# Patient Record
Sex: FEMALE | Race: WHITE | NOT HISPANIC OR LATINO
[De-identification: names, ages, dates, MRNs, and addresses within clinical notes are randomized per-mention and may not be internally consistent; named-entity substitution may affect disease eponyms.]

---

## 2017-07-26 ENCOUNTER — APPOINTMENT (OUTPATIENT)
Dept: CT IMAGING | Facility: IMAGING CENTER | Age: 82
End: 2017-07-26

## 2017-07-26 ENCOUNTER — OUTPATIENT (OUTPATIENT)
Dept: OUTPATIENT SERVICES | Facility: HOSPITAL | Age: 82
LOS: 1 days | End: 2017-07-26
Payer: MEDICARE

## 2017-07-26 DIAGNOSIS — Z00.8 ENCOUNTER FOR OTHER GENERAL EXAMINATION: ICD-10-CM

## 2017-07-26 PROCEDURE — 71250 CT THORAX DX C-: CPT

## 2018-07-11 ENCOUNTER — APPOINTMENT (OUTPATIENT)
Dept: CT IMAGING | Facility: IMAGING CENTER | Age: 83
End: 2018-07-11
Payer: MEDICARE

## 2018-07-11 ENCOUNTER — OUTPATIENT (OUTPATIENT)
Dept: OUTPATIENT SERVICES | Facility: HOSPITAL | Age: 83
LOS: 1 days | End: 2018-07-11
Payer: MEDICARE

## 2018-07-11 DIAGNOSIS — J47.9 BRONCHIECTASIS, UNCOMPLICATED: ICD-10-CM

## 2018-07-11 DIAGNOSIS — R05 COUGH: ICD-10-CM

## 2018-07-11 DIAGNOSIS — A31.1 CUTANEOUS MYCOBACTERIAL INFECTION: ICD-10-CM

## 2018-07-11 PROCEDURE — 71250 CT THORAX DX C-: CPT

## 2018-07-11 PROCEDURE — 71250 CT THORAX DX C-: CPT | Mod: 26

## 2019-06-07 ENCOUNTER — APPOINTMENT (OUTPATIENT)
Dept: CT IMAGING | Facility: CLINIC | Age: 84
End: 2019-06-07
Payer: MEDICARE

## 2019-06-07 ENCOUNTER — OUTPATIENT (OUTPATIENT)
Dept: OUTPATIENT SERVICES | Facility: HOSPITAL | Age: 84
LOS: 1 days | End: 2019-06-07
Payer: MEDICARE

## 2019-06-07 DIAGNOSIS — R05 COUGH: ICD-10-CM

## 2019-06-07 DIAGNOSIS — J47.9 BRONCHIECTASIS, UNCOMPLICATED: ICD-10-CM

## 2019-06-07 DIAGNOSIS — A31.8 OTHER MYCOBACTERIAL INFECTIONS: ICD-10-CM

## 2019-06-07 PROCEDURE — 71250 CT THORAX DX C-: CPT

## 2019-06-07 PROCEDURE — 71250 CT THORAX DX C-: CPT | Mod: 26

## 2019-06-25 ENCOUNTER — OUTPATIENT (OUTPATIENT)
Dept: OUTPATIENT SERVICES | Facility: HOSPITAL | Age: 84
LOS: 1 days | End: 2019-06-25
Payer: MEDICARE

## 2019-06-25 ENCOUNTER — APPOINTMENT (OUTPATIENT)
Dept: RADIOLOGY | Facility: CLINIC | Age: 84
End: 2019-06-25
Payer: MEDICARE

## 2019-06-25 DIAGNOSIS — Z00.8 ENCOUNTER FOR OTHER GENERAL EXAMINATION: ICD-10-CM

## 2019-06-25 PROCEDURE — 71046 X-RAY EXAM CHEST 2 VIEWS: CPT

## 2019-06-25 PROCEDURE — 71046 X-RAY EXAM CHEST 2 VIEWS: CPT | Mod: 26

## 2019-08-26 ENCOUNTER — INPATIENT (INPATIENT)
Facility: HOSPITAL | Age: 84
LOS: 2 days | Discharge: HOME | DRG: 643 | End: 2019-08-29
Attending: INTERNAL MEDICINE | Admitting: INTERNAL MEDICINE
Payer: MEDICARE

## 2019-08-26 VITALS
HEART RATE: 82 BPM | SYSTOLIC BLOOD PRESSURE: 180 MMHG | TEMPERATURE: 98 F | RESPIRATION RATE: 16 BRPM | HEIGHT: 65 IN | WEIGHT: 97 LBS | DIASTOLIC BLOOD PRESSURE: 82 MMHG | OXYGEN SATURATION: 96 %

## 2019-08-26 DIAGNOSIS — R47.81 SLURRED SPEECH: ICD-10-CM

## 2019-08-26 LAB
ALBUMIN SERPL ELPH-MCNC: 4.4 G/DL — SIGNIFICANT CHANGE UP (ref 3.3–5)
ALP SERPL-CCNC: 57 U/L — SIGNIFICANT CHANGE UP (ref 40–120)
ALT FLD-CCNC: 12 U/L — SIGNIFICANT CHANGE UP (ref 10–45)
ANION GAP SERPL CALC-SCNC: 13 MMOL/L — SIGNIFICANT CHANGE UP (ref 5–17)
APPEARANCE UR: CLEAR — SIGNIFICANT CHANGE UP
APTT BLD: 28.4 SEC — SIGNIFICANT CHANGE UP (ref 27.5–36.3)
AST SERPL-CCNC: 15 U/L — SIGNIFICANT CHANGE UP (ref 10–40)
BACTERIA # UR AUTO: NEGATIVE — SIGNIFICANT CHANGE UP
BASOPHILS # BLD AUTO: 0 K/UL — SIGNIFICANT CHANGE UP (ref 0–0.2)
BASOPHILS NFR BLD AUTO: 0.3 % — SIGNIFICANT CHANGE UP (ref 0–2)
BILIRUB SERPL-MCNC: 0.8 MG/DL — SIGNIFICANT CHANGE UP (ref 0.2–1.2)
BILIRUB UR-MCNC: NEGATIVE — SIGNIFICANT CHANGE UP
BUN SERPL-MCNC: 20 MG/DL — SIGNIFICANT CHANGE UP (ref 7–23)
CALCIUM SERPL-MCNC: 10.2 MG/DL — SIGNIFICANT CHANGE UP (ref 8.4–10.5)
CHLORIDE SERPL-SCNC: 87 MMOL/L — LOW (ref 96–108)
CO2 SERPL-SCNC: 25 MMOL/L — SIGNIFICANT CHANGE UP (ref 22–31)
COLOR SPEC: SIGNIFICANT CHANGE UP
CREAT ?TM UR-MCNC: 30 MG/DL — SIGNIFICANT CHANGE UP
CREAT SERPL-MCNC: 0.99 MG/DL — SIGNIFICANT CHANGE UP (ref 0.5–1.3)
DIFF PNL FLD: NEGATIVE — SIGNIFICANT CHANGE UP
EOSINOPHIL # BLD AUTO: 0.1 K/UL — SIGNIFICANT CHANGE UP (ref 0–0.5)
EOSINOPHIL NFR BLD AUTO: 0.4 % — SIGNIFICANT CHANGE UP (ref 0–6)
EPI CELLS # UR: 1 /HPF — SIGNIFICANT CHANGE UP
GLUCOSE SERPL-MCNC: 134 MG/DL — HIGH (ref 70–99)
GLUCOSE UR QL: NEGATIVE — SIGNIFICANT CHANGE UP
HCT VFR BLD CALC: 42 % — SIGNIFICANT CHANGE UP (ref 34.5–45)
HGB BLD-MCNC: 13.7 G/DL — SIGNIFICANT CHANGE UP (ref 11.5–15.5)
HYALINE CASTS # UR AUTO: 0 /LPF — SIGNIFICANT CHANGE UP (ref 0–2)
INR BLD: 1.17 RATIO — HIGH (ref 0.88–1.16)
KETONES UR-MCNC: ABNORMAL
LEUKOCYTE ESTERASE UR-ACNC: NEGATIVE — SIGNIFICANT CHANGE UP
LYMPHOCYTES # BLD AUTO: 1.3 K/UL — SIGNIFICANT CHANGE UP (ref 1–3.3)
LYMPHOCYTES # BLD AUTO: 10.7 % — LOW (ref 13–44)
MCHC RBC-ENTMCNC: 30.2 PG — SIGNIFICANT CHANGE UP (ref 27–34)
MCHC RBC-ENTMCNC: 32.7 GM/DL — SIGNIFICANT CHANGE UP (ref 32–36)
MCV RBC AUTO: 92.5 FL — SIGNIFICANT CHANGE UP (ref 80–100)
MONOCYTES # BLD AUTO: 1 K/UL — HIGH (ref 0–0.9)
MONOCYTES NFR BLD AUTO: 8.3 % — SIGNIFICANT CHANGE UP (ref 2–14)
NEUTROPHILS # BLD AUTO: 10.1 K/UL — HIGH (ref 1.8–7.4)
NEUTROPHILS NFR BLD AUTO: 80.3 % — HIGH (ref 43–77)
NITRITE UR-MCNC: NEGATIVE — SIGNIFICANT CHANGE UP
OSMOLALITY UR: 322 MOS/KG — SIGNIFICANT CHANGE UP (ref 300–900)
PH UR: 6.5 — SIGNIFICANT CHANGE UP (ref 5–8)
PLATELET # BLD AUTO: 347 K/UL — SIGNIFICANT CHANGE UP (ref 150–400)
POTASSIUM SERPL-MCNC: 4.3 MMOL/L — SIGNIFICANT CHANGE UP (ref 3.5–5.3)
POTASSIUM SERPL-SCNC: 4.3 MMOL/L — SIGNIFICANT CHANGE UP (ref 3.5–5.3)
PROT SERPL-MCNC: 8.1 G/DL — SIGNIFICANT CHANGE UP (ref 6–8.3)
PROT UR-MCNC: SIGNIFICANT CHANGE UP
PROTHROM AB SERPL-ACNC: 13.4 SEC — HIGH (ref 10–12.9)
RBC # BLD: 4.54 M/UL — SIGNIFICANT CHANGE UP (ref 3.8–5.2)
RBC # FLD: 12.3 % — SIGNIFICANT CHANGE UP (ref 10.3–14.5)
RBC CASTS # UR COMP ASSIST: 1 /HPF — SIGNIFICANT CHANGE UP (ref 0–4)
SODIUM SERPL-SCNC: 125 MMOL/L — LOW (ref 135–145)
SODIUM UR-SCNC: 55 MMOL/L — SIGNIFICANT CHANGE UP
SP GR SPEC: 1.03 — HIGH (ref 1.01–1.02)
UROBILINOGEN FLD QL: NEGATIVE — SIGNIFICANT CHANGE UP
WBC # BLD: 12.5 K/UL — HIGH (ref 3.8–10.5)
WBC # FLD AUTO: 12.5 K/UL — HIGH (ref 3.8–10.5)
WBC UR QL: 0 /HPF — SIGNIFICANT CHANGE UP (ref 0–5)

## 2019-08-26 PROCEDURE — 70498 CT ANGIOGRAPHY NECK: CPT | Mod: 26

## 2019-08-26 PROCEDURE — 70496 CT ANGIOGRAPHY HEAD: CPT | Mod: 26

## 2019-08-26 PROCEDURE — 93010 ELECTROCARDIOGRAM REPORT: CPT

## 2019-08-26 PROCEDURE — 99284 EMERGENCY DEPT VISIT MOD MDM: CPT | Mod: GC

## 2019-08-26 RX ORDER — LISINOPRIL 2.5 MG/1
20 TABLET ORAL DAILY
Refills: 0 | Status: DISCONTINUED | OUTPATIENT
Start: 2019-08-26 | End: 2019-08-27

## 2019-08-26 RX ORDER — AMLODIPINE BESYLATE 2.5 MG/1
5 TABLET ORAL DAILY
Refills: 0 | Status: DISCONTINUED | OUTPATIENT
Start: 2019-08-26 | End: 2019-08-29

## 2019-08-26 RX ORDER — HEPARIN SODIUM 5000 [USP'U]/ML
5000 INJECTION INTRAVENOUS; SUBCUTANEOUS EVERY 12 HOURS
Refills: 0 | Status: DISCONTINUED | OUTPATIENT
Start: 2019-08-26 | End: 2019-08-29

## 2019-08-26 RX ORDER — ASPIRIN/CALCIUM CARB/MAGNESIUM 324 MG
81 TABLET ORAL DAILY
Refills: 0 | Status: DISCONTINUED | OUTPATIENT
Start: 2019-08-26 | End: 2019-08-29

## 2019-08-26 RX ORDER — SODIUM CHLORIDE 9 MG/ML
1000 INJECTION INTRAMUSCULAR; INTRAVENOUS; SUBCUTANEOUS ONCE
Refills: 0 | Status: COMPLETED | OUTPATIENT
Start: 2019-08-26 | End: 2019-08-26

## 2019-08-26 RX ORDER — CARVEDILOL PHOSPHATE 80 MG/1
12.5 CAPSULE, EXTENDED RELEASE ORAL EVERY 12 HOURS
Refills: 0 | Status: DISCONTINUED | OUTPATIENT
Start: 2019-08-26 | End: 2019-08-28

## 2019-08-26 RX ORDER — ACETAMINOPHEN 500 MG
650 TABLET ORAL EVERY 6 HOURS
Refills: 0 | Status: DISCONTINUED | OUTPATIENT
Start: 2019-08-26 | End: 2019-08-29

## 2019-08-26 RX ADMIN — SODIUM CHLORIDE 1000 MILLILITER(S): 9 INJECTION INTRAMUSCULAR; INTRAVENOUS; SUBCUTANEOUS at 16:09

## 2019-08-26 NOTE — CONSULT NOTE ADULT - ATTENDING COMMENTS
VASCULAR NEUROLOGY ATTENDING  The patient is seen and examined the history and imaging are reviewed. I agree with the resident note unless otherwise noted. Patient at her neurologic baseline. Suspect hypertensive encephalopathy. Outpatient neurology follow-up.

## 2019-08-26 NOTE — ED ADULT NURSE NOTE - OBJECTIVE STATEMENT
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 Code stroke was called in triage at 1431. Came in to main ER at 1438  straight to Ct scan. Stroke protocol followed. Pt is fully awake, alert and orientedx3. Speech is clear and all extremities mobile. With strong hand grasp.  reports pt was last seen normal at 12 midnight. This morning at around 0900, pt speech was slurred, finding difficulty in speaking, dizzy, off balance and had difficulty on putting on her clothes.  states they went to PMD's office and was sent here for further evaluation. Denies chest pain, sob, N/V, fever or chills. With occasional productive cough of whitish secretions.  at bedside. Comfort/safety maintained. Will closely monitor.

## 2019-08-26 NOTE — H&P ADULT - NSHPLABSRESULTS_GEN_ALL_CORE
13.7   12.5  )-----------( 347      ( 26 Aug 2019 14:42 )             42.0           125<L>  |  87<L>  |  20  ----------------------------<  134<H>  4.3   |  25  |  0.99    Ca    10.2      26 Aug 2019 14:42    TPro  8.1  /  Alb  4.4  /  TBili  0.8  /  DBili  x   /  AST  15  /  ALT  12  /  AlkPhos  57                Urinalysis Basic - ( 26 Aug 2019 18:53 )    Color: Light Yellow / Appearance: Clear / S.027 / pH: x  Gluc: x / Ketone: Small  / Bili: Negative / Urobili: Negative   Blood: x / Protein: Trace / Nitrite: Negative   Leuk Esterase: Negative / RBC: 1 /hpf / WBC 0 /HPF   Sq Epi: x / Non Sq Epi: 1 /hpf / Bacteria: Negative        PT/INR - ( 26 Aug 2019 14:42 )   PT: 13.4 sec;   INR: 1.17 ratio         PTT - ( 26 Aug 2019 14:42 )  PTT:28.4 sec    Lactate Trend            CAPILLARY BLOOD GLUCOSE  129 (26 Aug 2019 15:01)      POCT Blood Glucose.: 129 mg/dL (26 Aug 2019 14:38)      < from: CT Angio Neck w/ IV Cont (19 @ 15:21) >    IMPRESSION: Mild atrophy appropriate for age. No hemorrhage or mass.   Normal CTA of the head and neck.    < end of copied text >    EKG SR NSST/T

## 2019-08-26 NOTE — CONSULT NOTE ADULT - ASSESSMENT
Assessment:  84y R-handed White F with PMH of HTN and PEACE on rifampin presenting as code stroke for confusion, dysarthria and word-finding difficulty.  LKW of 12:00 08/26/19.  Symptoms have resolved other than confusion (trouble putting clothes on and responding inappropriately).  Confusion could be due to active PEACE as well as hyponatremia     Not a tPA candidate as symptoms have resolved and outside window.   Not a thrombectomy candidate as NIHSS <6, symptoms resolved and no intracranial thrombus evident.     NIHSS: 0  MRS: 2    DDx include: TIA vs infectious/metabolic cause of acute altered mental status.     Plan  [] permissive HTN for 24 hours up to 220/110  [] gradual normotension after 24 hrs  [x] CT head  [x] CTA H&N  [] MRI head non contrast  [] STAT repeat CTH if change in neuro status  [x] continue ASA 81 daily  [] continue atorvastatin but can raise to 80 mg daily, titrate based on lipid profile  [] Lipid panel  [] HbA1C  [] PT/OT  [] dysphagia screen  [] From neurologic perspective, would recommend patient to have follow up outpatient with Dr. Tovar.     Assessment and plan discussed with the attending, Dr. La Wells, DO  PGY-2 Neurology Service Assessment:  84y R-handed White F with PMH of HTN and PEACE on rifampin presenting as code stroke for confusion, dysarthria and word-finding difficulty.  LKW of 12:00 08/26/19.  Symptoms have resolved other than confusion (trouble putting clothes on and responding inappropriately).  Confusion could be due to active PEACE as well as hyponatremia     Not a tPA candidate as symptoms have resolved and outside window.   Not a thrombectomy candidate as NIHSS <6, symptoms resolved and no intracranial thrombus evident.     NIHSS: 0  MRS: 2    DDx include: TIA vs infectious/metabolic cause of acute altered mental status.     Plan  [] permissive HTN for 24 hours up to 220/110  [] gradual normotension after 24 hrs  [x] CT head  [x] CTA H&N  [] MRI head non contrast in either CDU or outpatient if patient and family ok with this.   [] STAT repeat CTH if change in neuro status  [x] continue ASA 81 daily  [] continue atorvastatin but can raise to 80 mg daily, titrate based on lipid profile  [] Lipid panel  [] HbA1C  [] PT/OT  [] dysphagia screen  [] From neurologic perspective, would recommend patient to have follow up outpatient with Dr. Tovar.     Assessment and plan discussed with the attending, Dr. La Wells, DO  PGY-2 Neurology Service

## 2019-08-26 NOTE — ED PROVIDER NOTE - NS ED ROS FT
ROS: denies HA, fevers/chills, nausea/vomiting, chest pain, SOB, diaphoresis, abdominal pain, diarrhea, joint pain, dysuria/hematuria, rash    +generalized weakness, slurred speech

## 2019-08-26 NOTE — ED PROVIDER NOTE - CHIEF COMPLAINT
The patient is a 84y Female complaining of The patient is a 84y Female complaining of slurred speech

## 2019-08-26 NOTE — CONSULT NOTE ADULT - SUBJECTIVE AND OBJECTIVE BOX
NEUROLOGY CONSULT   HPI: Mrs. Amie Stovall is a 84y R-handed White woman with a PMH of HTN and PEACE on rifampin who presented on 08-26-19 as a code stroke for confusion, dysarthria and word-finding difficulties with LKW of 12:00 am 08/26/19.   reports PT woke up at 09:00 and when she started talking he noted confusion and speech difficulty manifest as slurring of speech and mixing words up.  The speech manifestations resolved prior to presentation, but the confusion was persistent.  He notes she was having difficulty putting on her clothes and was not responding appropriately to questions.        PMH:  PEACE on Rifampin  HTN    Home Medications:  Rifampin  Aspirin 81 mg  Atorvastatin 10 mg    ALLERGIES: No Known Allergies    OBJECTIVE  Vital Signs Last 24 Hrs  T(C): 37.1 (26 Aug 2019 15:08), Max: 37.1 (26 Aug 2019 15:08)  T(F): 98.8 (26 Aug 2019 15:08), Max: 98.8 (26 Aug 2019 15:08)  HR: 76 (26 Aug 2019 15:08) (76 - 82)  BP: 148/109 (26 Aug 2019 15:08) (148/109 - 180/82)  BP(mean): --  RR: 18 (26 Aug 2019 15:08) (16 - 18)  SpO2: 98% (26 Aug 2019 15:08) (96% - 98%)  I&O's Summary      PHYSICAL EXAM:  General: Older thin female appears stated age, in NAD  MSK: No erythema, tenderness or deformities.   Skin: No rashes, lesions or color changes.  Neurologic:  Mental Status: Awake, alert, oriented to person, place, situation, time. Normal affect. Follows all commands.  Slightly diminished attention span.    Language: Speech is clear, fluent with preserved naming, repetition and comprehension.    Cranial Nerves: PERRLA (R = 3mm, L = 3mm). Visual fields intact. EOMI no nystagmus. V1-3 intact to light touch.  No facial asymmetry b/l, full eye closure strength b/l. Hearing grossly normal to conversation.  Symmetric palate elevation in midline.  Head turning & shoulder shrug intact b/l. Tongue midline, normal movements, no atrophy.  Motor: Normal muscle bulk & tone. No noticeable tremor, myoclonus or pronator drift. 5/5 strength.	  Sensation: Symmetric B/L preserved sensation to light touch  Cortical: No extinction on double simultaneous touch and no signs of neglect.   Coordination: Intact rapid-alternating movements. No dysmetria on finger-to-nose or heel-to-shin.   Psychiatric: Normal mood and congruent affect.     CBC:                        13.7   12.5  )-----------( 347      ( 26 Aug 2019 14:42 )             42.0     CMP:  08-26    125<L>  |  87<L>  |  20  ----------------------------<  134<H>  4.3   |  25  |  0.99    Ca    10.2      26 Aug 2019 14:42    TPro  8.1  /  Alb  4.4  /  TBili  0.8  /  DBili  x   /  AST  15  /  ALT  12  /  AlkPhos  57  08-26    < from: CT Brain Stroke Protocol (08.26.19 @ 15:21) >   Mild atrophy appropriate for age. No hemorrhage or mass.   Normal CTA of the head and neck.    < end of copied text >

## 2019-08-26 NOTE — H&P ADULT - ASSESSMENT
84 f with    Confusion/ Mental status change  - Neurology evaluation Dr. Torres   - MRI brain  - B12, TSH, Folate  - hold Xanax    HTN control  - Nephrology evaluation  - cardiology evaluation Dr. Maradiaga    Hyponatremia  - fluid restrict  - nephrology evaluation called  - hold diuretics    PEACE  - Pulmonary evaluation Dr. Moody  - ID evaluation Dr. Casillas  - Cxr pending   - hold antibioRx     d/w patient and . Agree with plan.    Further action as per clinical course   John Rogers MD pager [phone]

## 2019-08-26 NOTE — H&P ADULT - NSHPREVIEWOFSYSTEMS_GEN_ALL_CORE
REVIEW OF SYSTEMS:    CONSTITUTIONAL: No weakness, fevers or chills  EYES/ENT: No visual changes;  No vertigo or throat pain   NECK: No pain or stiffness  RESPIRATORY: No cough, wheezing, hemoptysis; No shortness of breath  CARDIOVASCULAR: No chest pain or palpitations  GASTROINTESTINAL: No abdominal or epigastric pain. No nausea, vomiting, or hematemesis; No diarrhea or constipation. No melena or hematochezia.  GENITOURINARY: No dysuria, frequency or hematuria  NEUROLOGICAL: No numbness or weakness +confusion  SKIN: No itching, burning, rashes, or lesions   All other review of systems is negative unless indicated above.

## 2019-08-26 NOTE — H&P ADULT - HISTORY OF PRESENT ILLNESS
85yo F presents with slurred speech / weakness starting at 9am when she woke up. Last known normal was 12pm last night. Code stroke was called. Generalized weakness, patient was confused as well. Recent started Rx for PEACE with Rifampin, Azithromycin and Ethambutol .

## 2019-08-26 NOTE — ED PROVIDER NOTE - OBJECTIVE STATEMENT
85yo F presents with slurred speech / weakness starting at 9am when she woke up. Last known normal was 12pm last night. Code stroke was called. Generalized weakness, patient was confused as well

## 2019-08-26 NOTE — ED ADULT NURSE NOTE - NSIMPLEMENTINTERV_GEN_ALL_ED
Implemented All Fall with Harm Risk Interventions:  [place] to call system. Call bell, personal items and telephone within reach. Instruct patient to call for assistance. Room bathroom lighting operational. Non-slip footwear when patient is off stretcher. Physically safe environment: no spills, clutter or unnecessary equipment. Stretcher in lowest position, wheels locked, appropriate side rails in place. Provide visual cue, wrist band, yellow gown, etc. Monitor gait and stability. Monitor for mental status changes and reorient to person, place, and time. Review medications for side effects contributing to fall risk. Reinforce activity limits and safety measures with patient and family. Provide visual clues: red socks.

## 2019-08-26 NOTE — ED PROVIDER NOTE - PHYSICAL EXAMINATION
Gen: NAD  Head: NCAT  HEENT: PERRL, MMM, normal conjunctiva, anicteric, neck supple  Lung: CTAB, no adventitious sounds  CV: RRR, no murmurs, rubs or gallops  Abd: soft, NTND, no rebound or guarding, no CVAT  MSK: No edema, no visible deformities  Neuro: mildly slurred speech on exam; CN II-XII grossly intact. 5/5 strength and normal sensation in all extremities.  Skin: Warm and dry, no evidence of rash  Psych: normal mood and affect

## 2019-08-26 NOTE — ED ADULT NURSE NOTE - CHPI ED NUR SYMPTOMS NEG
no nausea/no vomiting/no change in level of consciousness/no loss of consciousness/no blurred vision/no confusion/no fever

## 2019-08-26 NOTE — H&P ADULT - NSHPPHYSICALEXAM_GEN_ALL_CORE
PHYSICAL EXAMINATION:  Vital Signs Last 24 Hrs  T(C): 37.1 (26 Aug 2019 18:00), Max: 37.1 (26 Aug 2019 15:08)  T(F): 98.8 (26 Aug 2019 18:00), Max: 98.8 (26 Aug 2019 15:08)  HR: 74 (26 Aug 2019 19:25) (70 - 82)  BP: 148/61 (26 Aug 2019 19:25) (148/61 - 180/82)  BP(mean): --  RR: 20 (26 Aug 2019 19:25) (16 - 20)  SpO2: 98% (26 Aug 2019 19:25) (96% - 99%)  CAPILLARY BLOOD GLUCOSE  129 (26 Aug 2019 15:01)      POCT Blood Glucose.: 129 mg/dL (26 Aug 2019 14:38)      GENERAL: NAD, well-groomed, well-developed  HEAD:  atraumatic, normocephalic  EYES: sclera anicteric  ENMT: mucous membranes moist  NECK: supple, No JVD  CHEST/LUNG: clear to auscultation bilaterally; no rales, rhonchi, or wheezing b/l  HEART: normal S1, S2  ABDOMEN: BS+, soft, ND, NT   EXTREMITIES:  pulses palpable; no clubbing, cyanosis, or edema b/l LEs  NEURO: awake, alert, interactive; moves all extremities  SKIN: no rashes or lesions

## 2019-08-26 NOTE — ED PROVIDER NOTE - ATTENDING CONTRIBUTION TO CARE
Patient presenting complaining of slurred speech.  Code stroke called in triage.  Per  when patient awoke at 9AM noted to be slurring speech and having difficulty getting dressed.  Further questioning revealed last known normal of last night.  Patients symptoms improving at this time - slurred speech has resolved but continuing to have confusion.  No known head trauma, no fevers, no chills, no sick contacts.    Unable to obtain full ROS secondary to patient acuity    Exam:  General: Patient well appearing, mildly hypertensive otherwise vital signs within normal limits  HEENT: airway patent with moist mucous membranes  Cardiac: RRR S1/S2 with strong peripheral pulses  Respiratory: lungs clear without respiratory distress  GI: abdomen soft, non tender, non distended  Neuro: CN grossly intact, strength 5/5 in all extremities, no slurring of speech appreciated  Skin: warm, well perfused    Patient taken to CT stat for code stroke, however by last known normal of last night outside tPA window.  Also possible infectious or metabolic etiology for patients slurred speech and confusion as no obvious lateralizing signs appreciated on exam.  Admission to medicine versus neurology for further workup.

## 2019-08-27 ENCOUNTER — TRANSCRIPTION ENCOUNTER (OUTPATIENT)
Age: 84
End: 2019-08-27

## 2019-08-27 DIAGNOSIS — E87.1 HYPO-OSMOLALITY AND HYPONATREMIA: ICD-10-CM

## 2019-08-27 DIAGNOSIS — I10 ESSENTIAL (PRIMARY) HYPERTENSION: ICD-10-CM

## 2019-08-27 LAB
ANION GAP SERPL CALC-SCNC: 12 MMOL/L — SIGNIFICANT CHANGE UP (ref 5–17)
BUN SERPL-MCNC: 15 MG/DL — SIGNIFICANT CHANGE UP (ref 7–23)
CALCIUM SERPL-MCNC: 9.4 MG/DL — SIGNIFICANT CHANGE UP (ref 8.4–10.5)
CHLORIDE SERPL-SCNC: 94 MMOL/L — LOW (ref 96–108)
CO2 SERPL-SCNC: 22 MMOL/L — SIGNIFICANT CHANGE UP (ref 22–31)
CREAT SERPL-MCNC: 0.86 MG/DL — SIGNIFICANT CHANGE UP (ref 0.5–1.3)
FOLATE SERPL-MCNC: >20 NG/ML — SIGNIFICANT CHANGE UP
GLUCOSE SERPL-MCNC: 97 MG/DL — SIGNIFICANT CHANGE UP (ref 70–99)
GRAM STN FLD: SIGNIFICANT CHANGE UP
HCT VFR BLD CALC: 35 % — SIGNIFICANT CHANGE UP (ref 34.5–45)
HGB BLD-MCNC: 11.5 G/DL — SIGNIFICANT CHANGE UP (ref 11.5–15.5)
MCHC RBC-ENTMCNC: 30.2 PG — SIGNIFICANT CHANGE UP (ref 27–34)
MCHC RBC-ENTMCNC: 32.9 GM/DL — SIGNIFICANT CHANGE UP (ref 32–36)
MCV RBC AUTO: 91.9 FL — SIGNIFICANT CHANGE UP (ref 80–100)
PLATELET # BLD AUTO: 260 K/UL — SIGNIFICANT CHANGE UP (ref 150–400)
POTASSIUM SERPL-MCNC: 3.8 MMOL/L — SIGNIFICANT CHANGE UP (ref 3.5–5.3)
POTASSIUM SERPL-SCNC: 3.8 MMOL/L — SIGNIFICANT CHANGE UP (ref 3.5–5.3)
RBC # BLD: 3.81 M/UL — SIGNIFICANT CHANGE UP (ref 3.8–5.2)
RBC # FLD: 13.2 % — SIGNIFICANT CHANGE UP (ref 10.3–14.5)
SODIUM SERPL-SCNC: 128 MMOL/L — LOW (ref 135–145)
SODIUM UR-SCNC: 27 MMOL/L — SIGNIFICANT CHANGE UP
SPECIMEN SOURCE: SIGNIFICANT CHANGE UP
TSH SERPL-MCNC: 3.5 UIU/ML — SIGNIFICANT CHANGE UP (ref 0.27–4.2)
VIT B12 SERPL-MCNC: 538 PG/ML — SIGNIFICANT CHANGE UP (ref 232–1245)
WBC # BLD: 6 K/UL — SIGNIFICANT CHANGE UP (ref 3.8–10.5)
WBC # FLD AUTO: 6 K/UL — SIGNIFICANT CHANGE UP (ref 3.8–10.5)

## 2019-08-27 PROCEDURE — 71250 CT THORAX DX C-: CPT | Mod: 26

## 2019-08-27 PROCEDURE — 99222 1ST HOSP IP/OBS MODERATE 55: CPT | Mod: GC

## 2019-08-27 PROCEDURE — 99223 1ST HOSP IP/OBS HIGH 75: CPT

## 2019-08-27 PROCEDURE — 71045 X-RAY EXAM CHEST 1 VIEW: CPT | Mod: 26

## 2019-08-27 PROCEDURE — 70551 MRI BRAIN STEM W/O DYE: CPT | Mod: 26

## 2019-08-27 RX ORDER — AMLODIPINE BESYLATE 2.5 MG/1
1 TABLET ORAL
Qty: 0 | Refills: 0 | DISCHARGE

## 2019-08-27 RX ORDER — ALBUTEROL 90 UG/1
1 AEROSOL, METERED ORAL
Qty: 0 | Refills: 0 | DISCHARGE

## 2019-08-27 RX ORDER — ETHAMBUTOL HYDROCHLORIDE 400 MG/1
600 TABLET, FILM COATED ORAL
Qty: 0 | Refills: 0 | DISCHARGE

## 2019-08-27 RX ORDER — SODIUM CHLORIDE 9 MG/ML
250 INJECTION INTRAMUSCULAR; INTRAVENOUS; SUBCUTANEOUS ONCE
Refills: 0 | Status: COMPLETED | OUTPATIENT
Start: 2019-08-27 | End: 2019-08-27

## 2019-08-27 RX ORDER — ASPIRIN/CALCIUM CARB/MAGNESIUM 324 MG
1 TABLET ORAL
Qty: 0 | Refills: 0 | DISCHARGE

## 2019-08-27 RX ORDER — LISINOPRIL 2.5 MG/1
10 TABLET ORAL DAILY
Refills: 0 | Status: DISCONTINUED | OUTPATIENT
Start: 2019-08-27 | End: 2019-08-28

## 2019-08-27 RX ORDER — ALPRAZOLAM 0.25 MG
0.5 TABLET ORAL ONCE
Refills: 0 | Status: DISCONTINUED | OUTPATIENT
Start: 2019-08-27 | End: 2019-08-27

## 2019-08-27 RX ORDER — PANTOPRAZOLE SODIUM 20 MG/1
40 TABLET, DELAYED RELEASE ORAL
Refills: 0 | Status: DISCONTINUED | OUTPATIENT
Start: 2019-08-27 | End: 2019-08-29

## 2019-08-27 RX ORDER — ATORVASTATIN CALCIUM 80 MG/1
1 TABLET, FILM COATED ORAL
Qty: 0 | Refills: 0 | DISCHARGE

## 2019-08-27 RX ORDER — POLYETHYLENE GLYCOL 3350 17 G/17G
17 POWDER, FOR SOLUTION ORAL DAILY
Refills: 0 | Status: DISCONTINUED | OUTPATIENT
Start: 2019-08-27 | End: 2019-08-29

## 2019-08-27 RX ORDER — SODIUM CHLORIDE 9 MG/ML
1 INJECTION INTRAMUSCULAR; INTRAVENOUS; SUBCUTANEOUS
Refills: 0 | Status: DISCONTINUED | OUTPATIENT
Start: 2019-08-27 | End: 2019-08-29

## 2019-08-27 RX ORDER — LISINOPRIL 2.5 MG/1
1 TABLET ORAL
Qty: 0 | Refills: 0 | DISCHARGE

## 2019-08-27 RX ADMIN — HEPARIN SODIUM 5000 UNIT(S): 5000 INJECTION INTRAVENOUS; SUBCUTANEOUS at 17:26

## 2019-08-27 RX ADMIN — HEPARIN SODIUM 5000 UNIT(S): 5000 INJECTION INTRAVENOUS; SUBCUTANEOUS at 05:46

## 2019-08-27 RX ADMIN — SODIUM CHLORIDE 1 GRAM(S): 9 INJECTION INTRAMUSCULAR; INTRAVENOUS; SUBCUTANEOUS at 17:26

## 2019-08-27 RX ADMIN — POLYETHYLENE GLYCOL 3350 17 GRAM(S): 17 POWDER, FOR SOLUTION ORAL at 12:45

## 2019-08-27 RX ADMIN — AMLODIPINE BESYLATE 5 MILLIGRAM(S): 2.5 TABLET ORAL at 05:45

## 2019-08-27 RX ADMIN — Medication 0.5 MILLIGRAM(S): at 15:07

## 2019-08-27 RX ADMIN — LISINOPRIL 20 MILLIGRAM(S): 2.5 TABLET ORAL at 05:45

## 2019-08-27 RX ADMIN — SODIUM CHLORIDE 500 MILLILITER(S): 9 INJECTION INTRAMUSCULAR; INTRAVENOUS; SUBCUTANEOUS at 18:41

## 2019-08-27 RX ADMIN — Medication 81 MILLIGRAM(S): at 12:46

## 2019-08-27 RX ADMIN — CARVEDILOL PHOSPHATE 12.5 MILLIGRAM(S): 80 CAPSULE, EXTENDED RELEASE ORAL at 05:46

## 2019-08-27 NOTE — CONSULT NOTE ADULT - ASSESSMENT
83yo F hx of PEACE, HTN presents with slurred speech / weakness starting at 9am when she woke up. Last known normal was 12pm last night. Code stroke was called. Generalized weakness, patient was confused as well. Recent started Rx for PEACE with Rifampin, Azithromycin and Ethambutol .  found to have elevated BP and hyponatremia. neuro consulted and ruled out for acute CVA.     I reviewed the CT with radiology. I do not believe the left lower lobe process represents PEACE , although I agree that the bronchiectasis and the tree in bud and GGO seen elsewhere are suggestive of PEACE but I am worried that the LLL process is different. Perhaps it represents aspiration pneumonia,. The dilated esophagus is noted on CT. The patient needs to have a swallow study and perhaps GI input.   Pt with pleuritic chest pain that also suggests more of a pneumonia. Please check sputum culture  speech swallow study  I am concerned by the weight loss and pleuritic aspect of chest pain and the lobar infiltrate    I would hold on the PEACE treatment for now.     BP management per team 83yo F hx of PEACE, HTN presents with slurred speech / weakness starting at 9am when she woke up. Last known normal was 12pm last night. Code stroke was called. Generalized weakness, patient was confused as well. Recent started Rx for PEACE with Rifampin, Azithromycin and Ethambutol .  found to have elevated BP and hyponatremia. neuro consulted and ruled out for acute CVA.     I reviewed the CT with radiology. I do not believe the left lower lobe process represents PEACE , although I agree that the bronchiectasis and the tree in bud and GGO seen elsewhere are suggestive of PEACE but I am worried that the LLL process is different. Perhaps it represents aspiration pneumonia,. The dilated esophagus is noted on CT. The patient needs to have a swallow study and perhaps GI input.   Pt with pleuritic chest pain that also suggests more of a pneumonia. Please check sputum culture  speech swallow study  I am concerned by the weight loss and pleuritic aspect of chest pain and the lobar infiltrate    I would hold on the PEACE treatment for now.     BP and electrolyte  management per team  for follow up CT of chest ( no contrast)

## 2019-08-27 NOTE — CONSULT NOTE ADULT - SUBJECTIVE AND OBJECTIVE BOX
Patient is a 84y old  Female who presented with a chief complaint of confusion (27 Aug 2019 11:51)      HPI:  85yo F presents with slurred speech / weakness starting at 9am when she woke up. Last known normal was 12pm last night. Code stroke was called. Generalized weakness, patient was confused as well. Recent started Rx for PEACE with Rifampin, Azithromycin and Ethambutol . (26 Aug 2019 20:31)    She denies any recent vomiting or diarrhea/constipation. She states she has had loss of appetite over the last year and believes she has lost approximately 20 pounds, more pronounced anorexia since starting PEACE therapy 2 months ago. She endorses poor PO intake and nausea when attempting to eat. She states she becomes full very quickly. 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 Patient has been known to have PEACE for several years and was being monitored. This year, imaging revealed increase in size of affected area of lung and approximately 2 months ago, treatment was started.     Pt denies history of vomiting or regurgitation of food (though reported to have admitted to this to another consultant) denies odynophgia or dysphagia.  States overall anorexia/decreased appetite, using PO supplements like Ensure to boost nutritional intake  No abdominal pain, diarrhea, melena, rectal bleeding or change in bowel habits/frequency. stools are brown.    +prior colonoscopies all reported "normal" last colonoscopy ~7 years ago, cannot recall name of physician  no prior EGD  no baseline dyspepsia, reports sporadic use of Tums (infrequent)  no FH of GI malignancy, no prior tobacco or history of ETOH abuse    PAST MEDICAL & SURGICAL HISTORY:  PEACE (mycobacterium avium-intracellulare)  HTN (hypertension)      Allergies  No Known Allergies      MEDICATIONS  (STANDING):  ALPRAZolam 0.5 milliGRAM(s) Oral once  amLODIPine   Tablet 5 milliGRAM(s) Oral daily  aspirin enteric coated 81 milliGRAM(s) Oral daily  carvedilol 12.5 milliGRAM(s) Oral every 12 hours  heparin  Injectable 5000 Unit(s) SubCutaneous every 12 hours  lisinopril 20 milliGRAM(s) Oral daily  polyethylene glycol 3350 17 Gram(s) Oral daily  sodium chloride 1 Gram(s) Oral two times a day    MEDICATIONS  (PRN):  acetaminophen   Tablet .. 650 milliGRAM(s) Oral every 6 hours PRN Temp greater or equal to 38.5C (101.3F), Mild Pain (1 - 3)      Social History:  Marital Status:  (  X )    (   ) Single    (   )    (  )   Lives with: (  ) alone  (  ) children   ( X ) spouse   (  ) parents  (  ) other      Family History   IBD (  ) Yes   (X  ) No  GI Malignancy (  )  Yes    (X  ) No    Health Management  Last Colonoscopy - approx 7 years ago, reportedly negative      Advanced Directives: (  X   ) None    (      ) DNR    (     ) DNI    (     ) Health Care Proxy:     Review of Systems:    General:  No fevers, chills, night sweats, fatigue, +weight loss ~15 pounds  CV:  No pain, palpitations, +HTn, see HPI  Resp:  No dyspnea, tachypnea, wheezing +cough +PEACE  GI:  see HPI  :  No pain, bleeding, incontinence, nocturia  Muscle:  No pain, weakness  Neuro:  see HPI  Psych:  No fatigue, insomnia, mood problems, depression  Endocrine:  No polyuria, polydypsia, cold/heat intolerance  Heme:  No petechiae, ecchymosis, easy bruisability  Skin:  No rash, tattoos, scars, edema      Vital Signs Last 24 Hrs  T(C): 36.7 (27 Aug 2019 12:00), Max: 37.1 (26 Aug 2019 15:08)  T(F): 98.1 (27 Aug 2019 12:00), Max: 98.8 (26 Aug 2019 15:08)  HR: 62 (27 Aug 2019 12:00) (58 - 82)  BP: 131/82 (27 Aug 2019 12:00) (117/57 - 180/82)  BP(mean): --  RR: 18 (27 Aug 2019 12:00) (16 - 20)  SpO2: 98% (27 Aug 2019 12:00) (96% - 99%)    PHYSICAL EXAM:    Constitutional: NAD, thin frail appearing WF OOB to chair  +spitting up saliva- clear, occ cough no acute distress +temporal wasting. no "wet" vocal quality  Neck: No LAD, supple no JVD  Respiratory: decreased BS bases L>R  Cardiovascular: S1 and S2, RRR  Gastrointestinal: BS+, soft, NT/ND, neg HSM,  Extremities: No peripheral edema, neg clubbing, cyanosis  Vascular: 2+ peripheral pulses  Neurological: alert and responsive. occasionally forgetful of details, no focal asymmetry  Psychiatric: Normal mood, normal affect  Skin: No rashes        LABS:                        11.5   6.00  )-----------( 260      ( 27 Aug 2019 08:47 )             35.0     08-27    128<L>  |  94<L>  |  15  ----------------------------<  97  3.8   |  22  |  0.86    Ca    9.4      27 Aug 2019 06:58      PT/INR - ( 26 Aug 2019 14:42 )   PT: 13.4 sec;   INR: 1.17 ratio    PTT - ( 26 Aug 2019 14:42 )  PTT:28.4 sec    Urinalysis Basic - ( 26 Aug 2019 18:53 )    Color: Light Yellow / Appearance: Clear / S.027 / pH: x  Gluc: x / Ketone: Small  / Bili: Negative / Urobili: Negative   Blood: x / Protein: Trace / Nitrite: Negative   Leuk Esterase: Negative / RBC: 1 /hpf / WBC 0 /HPF   Sq Epi: x / Non Sq Epi: 1 /hpf / Bacteria: Negative      LIVER FUNCTIONS - ( 26 Aug 2019 14:42 )  Alb: 4.4 g/dL / Pro: 8.1 g/dL / ALK PHOS: 57 U/L / ALT: 12 U/L / AST: 15 U/L / GGT: x             RADIOLOGY & ADDITIONAL TESTS:  < from: CT Chest No Cont (19 @ 13:03) >  FINDINGS:     Tubes/Lines: None.    Lungs And Airways: There is near complete consolidation of the left lower   lobe and opacification of the dilated left lower lobe segmental bronchi,   new finding since 2018.    The lingular and right upper lobe bronchiectasis are unchanged. The   tree-in-bud nodules in the bilateral upper lobes, lingula, and right   lower lobe are unchanged.    Pleura: No pneumothorax. Small left pleural effusion.    Mediastinum: There are no enlarged chest lymph nodes. The visualized   portions of the thyroid gland are unremarkable. The esophagus is dilated.    Heart and Vasculature: The heart is normal in size. Small amount of   pericardial fluid. Minimal aortic valve calcifications. Coronary artery   calcifications of the left anterior descending coronary artery. The aorta   is normal in caliber. Atheromatous disease of the aorta.    Upper Abdomen: The upper abdomen is unremarkable.    Bones And Soft Tissues: The bones are unremarkable.  The soft tissues are   unremarkable.      IMPRESSION:     1.  The opacification of the segmental bronchi of the left lower lobe is   new since 2018.  2.  The near complete consolidation of the left lower lobe is new since   2018.  3.  The additional bilateral foci of bronchiectasis and tree-in-bud   nodules are unchanged since 2018.  4.  These findings can occur in the clinical setting of infection (?   Underlying history of atypical mycobacterial infection).    < from: CT Angio Neck w/ IV Cont (19 @ 15:21) >    IMPRESSION: Mild atrophy appropriate for age. No hemorrhage or mass.   Normal CTA of the head and neck.       Dr. Agrawal discussed these findings with neurology resident on 2019   2:54 PM with read back. No hemorrhage.    < end of copied text > Patient is a 84y old  Female who presented with a chief complaint of confusion (27 Aug 2019 11:51)    HPI:  83yo F presents with slurred speech / weakness starting at 9am when she woke up. Last known normal was 12pm last night. Code stroke was called. Generalized weakness, patient was confused as well. Recent started Rx for PEACE with Rifampin, Azithromycin and Ethambutol . (26 Aug 2019 20:31)    She denies any recent vomiting or diarrhea/constipation. She states she has had loss of appetite over the last year and believes she has lost approximately 20 pounds, more pronounced anorexia since starting PEACE therapy 2 months ago. She endorses poor PO intake and nausea when attempting to eat. She states she becomes full very quickly. 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 Patient has been known to have PEACE for several years and was being monitored. This year, imaging revealed increase in size of affected area of lung and approximately 2 months ago, treatment was started.     Pt denies history of vomiting or regurgitation of food (though reported to have admitted to this to another consultant) denies odynophgia or dysphagia.  States overall anorexia/decreased appetite, using PO supplements like Ensure to boost nutritional intake  No abdominal pain, diarrhea, melena, rectal bleeding or change in bowel habits/frequency. stools are brown.    +prior colonoscopies all reported "normal" last colonoscopy ~7 years ago, cannot recall name of physician  no prior EGD  no baseline dysphagia, dyspepsia, reports sporadic use of Tums (infrequent)  no FH of GI malignancy, no prior tobacco or history of ETOH abuse    PAST MEDICAL & SURGICAL HISTORY:  PEACE (mycobacterium avium-intracellulare)  HTN (hypertension)    Allergies  No Known Allergies    MEDICATIONS  (STANDING):  ALPRAZolam 0.5 milliGRAM(s) Oral once  amLODIPine   Tablet 5 milliGRAM(s) Oral daily  aspirin enteric coated 81 milliGRAM(s) Oral daily  carvedilol 12.5 milliGRAM(s) Oral every 12 hours  heparin  Injectable 5000 Unit(s) SubCutaneous every 12 hours  lisinopril 20 milliGRAM(s) Oral daily  polyethylene glycol 3350 17 Gram(s) Oral daily  sodium chloride 1 Gram(s) Oral two times a day    MEDICATIONS  (PRN):  acetaminophen   Tablet .. 650 milliGRAM(s) Oral every 6 hours PRN Temp greater or equal to 38.5C (101.3F), Mild Pain (1 - 3)    Social History:  Marital Status:  (  X )    (   ) Single    (   )    (  )   Lives with: (  ) alone  (  ) children   ( X ) spouse   (  ) parents  (  ) other    Family History   IBD (  ) Yes   (X  ) No  GI Malignancy (  )  Yes    (X  ) No    Health Management  Last Colonoscopy - approx 7 years ago, reportedly negative    Advanced Directives: (  X   ) None    (      ) DNR    (     ) DNI    (     ) Health Care Proxy:     Review of Systems:  General:  No fevers, chills, night sweats, fatigue, +weight loss ~15 pounds  CV:  No pain, palpitations, +HTn, see HPI  Resp:  No dyspnea, tachypnea, wheezing +cough +PEACE  GI:  see HPI  :  No pain, bleeding, incontinence, nocturia  Muscle:  No pain, weakness  Neuro:  see HPI  Psych:  No fatigue, insomnia, mood problems, depression  Endocrine:  No polyuria, polydypsia, cold/heat intolerance  Heme:  No petechiae, ecchymosis, easy bruisability  Skin:  No rash, tattoos, scars, edema    Vital Signs Last 24 Hrs  T(C): 36.7 (27 Aug 2019 12:00), Max: 37.1 (26 Aug 2019 15:08)  T(F): 98.1 (27 Aug 2019 12:00), Max: 98.8 (26 Aug 2019 15:08)  HR: 62 (27 Aug 2019 12:00) (58 - 82)  BP: 131/82 (27 Aug 2019 12:00) (117/57 - 180/82)  BP(mean): --  RR: 18 (27 Aug 2019 12:00) (16 - 20)  SpO2: 98% (27 Aug 2019 12:00) (96% - 99%)    PHYSICAL EXAM:  Constitutional: NAD, thin frail appearing WF OOB to chair  +spitting up saliva- clear, occ cough no acute distress +temporal wasting. no "wet" vocal quality  Neck: No LAD, supple no JVD  Respiratory: decreased BS bases L>R  Cardiovascular: S1 and S2, RRR  Gastrointestinal: BS+, soft, NT/ND, neg HSM,  Extremities: No peripheral edema, neg clubbing, cyanosis  Vascular: 2+ peripheral pulses  Neurological: alert and responsive. occasionally forgetful of details, no focal asymmetry  Psychiatric: Normal mood, normal affect  Skin: No rashes    LABS:                      11.5   6.00  )-----------( 260      ( 27 Aug 2019 08:47 )             35.0     08-27    128<L>  |  94<L>  |  15  ----------------------------<  97  3.8   |  22  |  0.86    Ca    9.4      27 Aug 2019 06:58    PT/INR - ( 26 Aug 2019 14:42 )   PT: 13.4 sec;   INR: 1.17 ratio    PTT - ( 26 Aug 2019 14:42 )  PTT:28.4 sec    Urinalysis Basic - ( 26 Aug 2019 18:53 )  Color: Light Yellow / Appearance: Clear / S.027 / pH: x  Gluc: x / Ketone: Small  / Bili: Negative / Urobili: Negative   Blood: x / Protein: Trace / Nitrite: Negative   Leuk Esterase: Negative / RBC: 1 /hpf / WBC 0 /HPF   Sq Epi: x / Non Sq Epi: 1 /hpf / Bacteria: Negative    LIVER FUNCTIONS - ( 26 Aug 2019 14:42 )  Alb: 4.4 g/dL / Pro: 8.1 g/dL / ALK PHOS: 57 U/L / ALT: 12 U/L / AST: 15 U/L / GGT: x             RADIOLOGY & ADDITIONAL TESTS:  < from: CT Chest No Cont (19 @ 13:03) >  FINDINGS:     Tubes/Lines: None.    Lungs And Airways: There is near complete consolidation of the left lower   lobe and opacification of the dilated left lower lobe segmental bronchi,   new finding since 2018.    The lingular and right upper lobe bronchiectasis are unchanged. The   tree-in-bud nodules in the bilateral upper lobes, lingula, and right   lower lobe are unchanged.    Pleura: No pneumothorax. Small left pleural effusion.    Mediastinum: There are no enlarged chest lymph nodes. The visualized   portions of the thyroid gland are unremarkable. The esophagus is dilated.    Heart and Vasculature: The heart is normal in size. Small amount of   pericardial fluid. Minimal aortic valve calcifications. Coronary artery   calcifications of the left anterior descending coronary artery. The aorta   is normal in caliber. Atheromatous disease of the aorta.    Upper Abdomen: The upper abdomen is unremarkable.    Bones And Soft Tissues: The bones are unremarkable.  The soft tissues are   unremarkable.      IMPRESSION:     1.  The opacification of the segmental bronchi of the left lower lobe is   new since 2018.  2.  The near complete consolidation of the left lower lobe is new since   2018.  3.  The additional bilateral foci of bronchiectasis and tree-in-bud   nodules are unchanged since 2018.  4.  These findings can occur in the clinical setting of infection (?   Underlying history of atypical mycobacterial infection).    < from: CT Angio Neck w/ IV Cont (19 @ 15:21) >    IMPRESSION: Mild atrophy appropriate for age. No hemorrhage or mass.   Normal CTA of the head and neck.       Dr. Agrawal discussed these findings with neurology resident on 2019   2:54 PM with read back. No hemorrhage.    < end of copied text >

## 2019-08-27 NOTE — DISCHARGE NOTE NURSING/CASE MANAGEMENT/SOCIAL WORK - PATIENT PORTAL LINK FT
You can access the FollowMyHealth Patient Portal offered by [facility] by registering at the following website: [email or web address]. By joining [facility]’s FollowMyHealth portal, you will also be able to view your health information using other applications (apps) compatible with our system.

## 2019-08-27 NOTE — CONSULT NOTE ADULT - PROBLEM SELECTOR RECOMMENDATION 9
On presentation, Na 125 with improvement today to 128. Patient given 1L NS. On exam, patient appears euvolemic without AMS. Urine studies with osmolality >300, urine Na >20. Differential: euvolemic hyponatremia (SIADH, thiazide use) vs hypovolemic hyponatremia. Patient likely hyponatremic secondary to dyazide (triamterene-hydrochlorothiazide) use vs SIADH 2/2 new pulmonary process (PEACE vs malignancy vs infection/aspiration pneumonia). There is likely an additional component of dehydration/poor PO intake underlying hyponatremia.  - Trend BMP daily  - Limit free water intake  - Give 1g salt tabs BID  - Repeat urine studies (urine osm, urine Na)  - Recommend increase protein intake, may benefit from nutrition consult

## 2019-08-27 NOTE — PROGRESS NOTE ADULT - ASSESSMENT
84 f with    Confusion/ Mental status change improving   - Neurology follow Dr. Torres   - MRI brain pending    HTN control  - Nephrology evaluation noted  - cardiology evaluation Dr. Maradiaga noted    Hyponatremia improving   - fluid restrict  - nephrology evaluation noted  - hold diuretics    PEACE  - Pulmonary evaluation Dr. Moody noted  - ID evaluation Dr. Casillas noted  - CT chest pending   - hold antibioRx     Dilated esophagus  - esophagogram pending   - Gastroenterology evaluation    Dysphagia by history  - SS evaluation     d/w patient    John Rogers MD pager [phone]

## 2019-08-27 NOTE — CONSULT NOTE ADULT - SUBJECTIVE AND OBJECTIVE BOX
CHIEF COMPLAINT: confusion    HISTORY OF PRESENT ILLNESS:  83yo F hx of PEACE, HTN presents with slurred speech / weakness starting at 9am when she woke up. Last known normal was 12pm last night. Code stroke was called. Generalized weakness, patient was confused as well. Recent started Rx for PEACE with Rifampin, Azithromycin and Ethambutol .  found to have elevated BP and hyponatremia. neuro consulted and ruled out for acute CVA. currently denies any cp/sob/palps/dizziness/edema/headache/confusion      Allergies  No Known Allergies      MEDICATIONS:  amLODIPine   Tablet 5 milliGRAM(s) Oral daily  aspirin enteric coated 81 milliGRAM(s) Oral daily  carvedilol 12.5 milliGRAM(s) Oral every 12 hours  heparin  Injectable 5000 Unit(s) SubCutaneous every 12 hours  lisinopril 20 milliGRAM(s) Oral daily  acetaminophen   Tablet .. 650 milliGRAM(s) Oral every 6 hours PRN      PAST MEDICAL & SURGICAL HISTORY:  PEACE (mycobacterium avium-intracellulare)  HTN (hypertension)      FAMILY HISTORY:  [place]    SOCIAL HISTORY:    non smoker. independent in adl      REVIEW OF SYSTEMS:  See HPI, otherwise complete 10 point review of systems negative    [ ] All others negative	      PHYSICAL EXAM:  T(C): 36.6 (08-27-19 @ 05:47), Max: 37.1 (08-26-19 @ 15:08)  HR: 58 (08-27-19 @ 05:47) (58 - 82)  BP: 117/57 (08-27-19 @ 05:47) (117/57 - 180/82)  RR: 18 (08-27-19 @ 05:47) (16 - 20)  SpO2: 96% (08-27-19 @ 05:47) (96% - 99%)  Wt(kg): --  I&O's Summary      Appearance: No Acute Distress	  HEENT:  Normal oral mucosa, PERRL, EOMI	  Cardiovascular: Normal S1 S2, No JVD, No murmurs/rubs/gallops  Respiratory: Lungs clear to auscultation bilaterally  Gastrointestinal:  Soft, Non-tender, + BS	  Skin: No rashes, No ecchymoses, No cyanosis	  Neurologic: Non-focal  Extremities: No clubbing, cyanosis or edema  Vascular: Peripheral pulses palpable 2+ bilaterally  Psychiatry: A & O x 3, Mood & affect appropriate    Laboratory Data:	 	    CBC Full  -  ( 26 Aug 2019 14:42 )  WBC Count : 12.5 K/uL  Hemoglobin : 13.7 g/dL  Hematocrit : 42.0 %  Platelet Count - Automated : 347 K/uL  Mean Cell Volume : 92.5 fl  Mean Cell Hemoglobin : 30.2 pg  Mean Cell Hemoglobin Concentration : 32.7 gm/dL  Auto Neutrophil # : 10.1 K/uL  Auto Lymphocyte # : 1.3 K/uL  Auto Monocyte # : 1.0 K/uL  Auto Eosinophil # : 0.1 K/uL  Auto Basophil # : 0.0 K/uL  Auto Neutrophil % : 80.3 %  Auto Lymphocyte % : 10.7 %  Auto Monocyte % : 8.3 %  Auto Eosinophil % : 0.4 %  Auto Basophil % : 0.3 %    08-27    128<L>  |  94<L>  |  15  ----------------------------<  97  3.8   |  22  |  0.86  08-26    125<L>  |  87<L>  |  20  ----------------------------<  134<H>  4.3   |  25  |  0.99    Ca    9.4      27 Aug 2019 06:58  Ca    10.2      26 Aug 2019 14:42    TPro  8.1  /  Alb  4.4  /  TBili  0.8  /  DBili  x   /  AST  15  /  ALT  12  /  AlkPhos  57  08-26          ECG:  	nsr no acute ischemic changes      Assessment:  -hypertensive urgency  -confusion  -hyponatremia  -PEACE currently on triple therapyy  -bronchiectasis    Recs:  -c/w PEACE meds per pulm  -f/u renal regarding hyponatremia. ? SIADH from medications vs hypovolemic. sodium slowly improving  -f/u urine studies  -c/w current anti-hypertensives. goal SBP < 150 (amlodipine 5, coreg 12.5 bid, lisinopril 20)  -can likely d/c asa if not a home med  -dvt ppx        Greater than 60 minutes spent on total encounter; more than 50% of the visit was spent counseling and/or coordinating care by the attending physician.   	  Holland Maradiaga MD   Cardiovascular Diseases  [phone]

## 2019-08-27 NOTE — CONSULT NOTE ADULT - ATTENDING COMMENTS
Deepti Gutierrez MD, FACP, FACG, AGAF  [facility]  [phone]     After hours and weekend coverage GI service : [phone]

## 2019-08-27 NOTE — CONSULT NOTE ADULT - SUBJECTIVE AND OBJECTIVE BOX
Patient is a 84y old  Female who presents with a chief complaint of confusion (27 Aug 2019 09:14)      HPI:  83yo F presents with slurred speech / weakness starting at 9am when she woke up. Last known normal was 12pm last night. Code stroke was called. Generalized weakness, patient was confused as well. Recent started Rx for PEACE with Rifampin, Azithromycin and Ethambutol . (26 Aug 2019 20:31)      PAST MEDICAL & SURGICAL HISTORY:  PEACE (mycobacterium avium-intracellulare)  HTN (hypertension)      Social history:   Social History:    Marital Status:   Occupation:   Lives with:     Substance Use :  Tobacco Usage:  (   ) never smoked   (   ) former smoker   (   ) current smoker  (     ) pack year  (        ) last tobacco use date  Alcohol Usage:  Travel:  Pets:          FAMILY HISTORY:      REVIEW OF SYSTEMS  General:	Denies any malaise fatigue or chills. Fevers absent    Skin:No rash  	  Ophthalmologic:Denies any visual complaints,discharge redness or photophobia  	  ENMT:No nasal discharge,headache,sinus congestion or throat pain.No dental complaints    Respiratory and Thorax:No cough,sputum or chest pain.Denies shortness of breath  	  Cardiovascular:	No chest pain,palpitaions or dizziness    Gastrointestinal:	NO nausea,abdominal pain or diarrhea.    Genitourinary:	No dysuria,frequency. No flank pain    Musculoskeletal:	No joint swelling or pain.No weakness    Neurological:No confusion,diziness.No extremity weakness.No bladder or bowel incontinence	    Psychiatric:No delusions or hallucinations	    Hematology/Lymphatics:	No LN swelling.No gum bleeding     Endocrine:	No recent weight gain or loss.No abnormal heat/cold intolerance    Allergic/Immunologic:	No hives or rash     Allergies    No Known Allergies    Intolerances        Antimicrobials:       MEDICATIONS  (prior antimicrobials ):                 MEDICATIONS  (STANDING):  amLODIPine   Tablet 5 milliGRAM(s) Oral daily  aspirin enteric coated 81 milliGRAM(s) Oral daily  carvedilol 12.5 milliGRAM(s) Oral every 12 hours  heparin  Injectable 5000 Unit(s) SubCutaneous every 12 hours  lisinopril 20 milliGRAM(s) Oral daily  polyethylene glycol 3350 17 Gram(s) Oral daily    MEDICATIONS  (PRN):  acetaminophen   Tablet .. 650 milliGRAM(s) Oral every 6 hours PRN Temp greater or equal to 38.5C (101.3F), Mild Pain (1 - 3)        Vital Signs Last 24 Hrs  T(C): 36.6 (27 Aug 2019 05:47), Max: 37.1 (26 Aug 2019 15:08)  T(F): 97.9 (27 Aug 2019 05:47), Max: 98.8 (26 Aug 2019 15:08)  HR: 58 (27 Aug 2019 05:47) (58 - 82)  BP: 117/57 (27 Aug 2019 05:47) (117/57 - 180/82)  BP(mean): --  RR: 18 (27 Aug 2019 05:47) (16 - 20)  SpO2: 96% (27 Aug 2019 05:47) (96% - 99%)    PHYSICAL EXAM:Pleasant patient in no acute distress.      Constitutional:Comfortable.Awake and alert  No cachexia     Eyes:PERRL EOMI.NO discharge or conjunctival injection    ENMT:No sinus tenderness.No thrush.No pharyngeal exudate or erythema.Fair dental hygiene    Neck:Supple,No LN,no JVD      Respiratory:Good air entry bilaterally,CTA    Cardiovascular:S1 S2 wnl, No murmurs,rub or gallops    Gastrointestinal:Soft BS(+) no tenderness no masses ,No rebound or guarding    Genitourinary:No CVA tendereness     Rectal:    Extremities:No cyanosis,clubbing or edema.    Vascular:peripheral pulses felt    Neurological:AAO X 3,No grossly focal deficits    Skin:No rash     Lymph Nodes:No palpable LNs    Musculoskeletal:No joint swelling or LOM    Psychiatric:Affect normal.                                11.5   6.00  )-----------( 260      ( 27 Aug 2019 08:47 )             35.0     LIVER FUNCTIONS - ( 26 Aug 2019 14:42 )  Alb: 4.4 g/dL / Pro: 8.1 g/dL / ALK PHOS: 57 U/L / ALT: 12 U/L / AST: 15 U/L / GGT: x                 128<L>  |  94<L>  |  15  ----------------------------<  97  3.8   |  22  |  0.86    Ca    9.4      27 Aug 2019 06:58    TPro  8.1  /  Alb  4.4  /  TBili  0.8  /  DBili  x   /  AST  15  /  ALT  12  /  AlkPhos  57  08-          Urinalysis Basic - ( 26 Aug 2019 18:53 )    Color: Light Yellow / Appearance: Clear / S.027 / pH: x  Gluc: x / Ketone: Small  / Bili: Negative / Urobili: Negative   Blood: x / Protein: Trace / Nitrite: Negative   Leuk Esterase: Negative / RBC: 1 /hpf / WBC 0 /HPF   Sq Epi: x / Non Sq Epi: 1 /hpf / Bacteria: Negative        RECENT CULTURES:      MICROBIOLOGY:          Radiology: Patient is a 84y old  Female who presents with a chief complaint of confusion (27 Aug 2019 09:14)      HPI:  85yo F presents with slurred speech / weakness starting at 9am when she woke up. Last known normal was 12pm last night. Code stroke was called. Generalized weakness, patient was confused as well. Recent started Rx for PEACE with Rifampin, Azithromycin and Ethambutol . (26 Aug 2019 20:31)  Pt notes feeling shakey the week prior to this event. SHe feels like she is now mostly back to herself      PAST MEDICAL & SURGICAL HISTORY:  PECAE (mycobacterium avium-intracellulare)  HTN (hypertension)      Social history:     Marital Status:  Occupation: housewife  Lives with:      Substance Use : denies  Tobacco Usage:  ( x  ) never smoked   (   ) former smoker   (   ) current smoker  (     ) pack year  (        ) last tobacco use date  Alcohol Usage: denies  Travel: [place] and [place]       FAMILY HISTORY:  mother- - lymphoma  father- MI-  at 52  two brothers- one  from MI and the other pancreatic ca  3 sons      REVIEW OF SYSTEMS  General:	notes 15 pound  weight loss, decreased appetite    Skin:No rash  	  Ophthalmologic:Denies any visual complaints,discharge redness or photophobia  	  ENMT:No nasal discharge,headache,sinus congestion or throat pain.No dental complaints    Respiratory and Thorax:  pleuritic chest pain on the left  productive cough  Cardiovascular:	No chest pain, palpitaions or dizziness    Gastrointestinal:	NO nausea,abdominal pain or diarrhea. THE patient noes that when eats sometimes the food comes up and she needs to swallow again    Genitourinary:	No dysuria,frequency. No flank pain    Musculoskeletal:	No joint swelling or pain.No weakness    Neurological :No confusion,  feels " shakey"    Psychiatric:No delusions or hallucinations	    Hematology/Lymphatics:	No LN swelling.No gum bleeding     Endocrine:	No recent weight gain or loss.No abnormal heat/cold intolerance    Allergic/Immunologic:	No hives or rash     Allergies    No Known Allergies    Intolerances        Antimicrobials:       MEDICATIONS  (prior antimicrobials ):                 MEDICATIONS  (STANDING):  amLODIPine   Tablet 5 milliGRAM(s) Oral daily  aspirin enteric coated 81 milliGRAM(s) Oral daily  carvedilol 12.5 milliGRAM(s) Oral every 12 hours  heparin  Injectable 5000 Unit(s) SubCutaneous every 12 hours  lisinopril 20 milliGRAM(s) Oral daily  polyethylene glycol 3350 17 Gram(s) Oral daily    MEDICATIONS  (PRN):  acetaminophen   Tablet .. 650 milliGRAM(s) Oral every 6 hours PRN Temp greater or equal to 38.5C (101.3F), Mild Pain (1 - 3)        Vital Signs Last 24 Hrs  T(C): 36.6 (27 Aug 2019 05:47), Max: 37.1 (26 Aug 2019 15:08)  T(F): 97.9 (27 Aug 2019 05:47), Max: 98.8 (26 Aug 2019 15:08)  HR: 58 (27 Aug 2019 05:47) (58 - 82)  BP: 117/57 (27 Aug 2019 05:47) (117/57 - 180/82)  BP(mean): --  RR: 18 (27 Aug 2019 05:47) (16 - 20)  SpO2: 96% (27 Aug 2019 05:47) (96% - 99%)    PHYSICAL EXAM:Pleasant patient in no acute distress.      Constitutional:Comfortable.Awake  answers questions appropriately but still slightly off.   thin    Eyes:PERRL EOMI.NO discharge or conjunctival injection    ENMT:No sinus tenderness.No thrush.No pharyngeal exudate or erythema. caps    Neck:Supple,No LN,no JVD      Respiratory:Good air entry bilaterally,CTA    Cardiovascular:S1 S2 wnl,     Gastrointestinal:Soft BS(+) no tenderness no masses     Extremities:No cyanosis,clubbing or edema.    Vascular:peripheral pulses felt    Neurological:AAO X 3,No grossly focal deficits    Skin:No rash     Lymph Nodes:No palpable LNs    Musculoskeletal:No joint swelling or LOM    Psychiatric:Affect normal.                                11.5   6.00  )-----------( 260      ( 27 Aug 2019 08:47 )             35.0     LIVER FUNCTIONS - ( 26 Aug 2019 14:42 )  Alb: 4.4 g/dL / Pro: 8.1 g/dL / ALK PHOS: 57 U/L / ALT: 12 U/L / AST: 15 U/L / GGT: x             -    128<L>  |  94<L>  |  15  ----------------------------<  97  3.8   |  22  |  0.86    Ca    9.4      27 Aug 2019 06:58    TPro  8.1  /  Alb  4.4  /  TBili  0.8  /  DBili  x   /  AST  15  /  ALT  12  /  AlkPhos  57            Urinalysis Basic - ( 26 Aug 2019 18:53 )    Color: Light Yellow / Appearance: Clear / S.027 / pH: x  Gluc: x / Ketone: Small  / Bili: Negative / Urobili: Negative   Blood: x / Protein: Trace / Nitrite: Negative   Leuk Esterase: Negative / RBC: 1 /hpf / WBC 0 /HPF   Sq Epi: x / Non Sq Epi: 1 /hpf / Bacteria: Negative        RECENT CULTURES:      MICROBIOLOGY:    Test Item Value Comments Test Item Status Sensitivities              Acid Fast Bacilli Smear   Rare Acid fast bacillus seen by fluorochrome stain. Final              Culture Result   Growth of acid fast bacilli detected in broth,  Numerous Mycobacterium avium complex by DNA probe         Radiology:    < from: CT Angio Neck w/ IV Cont (19 @ 15:21) >    IMPRESSION: Mild atrophy appropriate for age. No hemorrhage or mass.   Normal CTA of the head and neck.    < end of copied text >    < from: CT Chest No Cont (19 @ 13:03) >  EXAM:  CT CHEST        PROCEDURE DATE:  2019           INTERPRETATION:  CT CHEST WITHOUT CONTRAST    INDICATION: History of pulmonary nodule. Cough.    TECHNIQUE: Unenhanced helical images were obtained of the chest. Coronal   and sagittal images were reconstructed. Maximum intensity projection   images were generated.     COMPARISON: CT chest 2018. CT chest 2017. CT chest 2016.    FINDINGS:     Tubes/Lines: None.    Lungs And Airways: There is near complete consolidation of the left lower   lobe and opacification of the dilated left lower lobe segmental bronchi,   new finding since 2018.    The lingular and right upper lobe bronchiectasis are unchanged. The   tree-in-bud nodules in the bilateral upper lobes, lingula, and right   lower lobe are unchanged.    Pleura: No pneumothorax. Small left pleural effusion.    Mediastinum: There are no enlarged chest lymph nodes. The visualized   portions of the thyroid gland are unremarkable. The esophagus is dilated.    Heart and Vasculature: The heart is normal in size. Small amount of   pericardial fluid. Minimal aortic valve calcifications. Coronary artery   calcifications of the left anterior descending coronary artery. The aorta   is normal in caliber. Atheromatous disease of the aorta.    Upper Abdomen: The upper abdomen is unremarkable.    Bones And Soft Tissues: The bones are unremarkable.  The soft tissues are   unremarkable.      IMPRESSION:     1.  The opacification of the segmental bronchi of the left lower lobe is   new since 2018.  2.  The near complete consolidation of the left lower lobe is new since   2018.  3.  The additional bilateral foci of bronchiectasis and tree-in-bud   nodules are unchanged since 2018.  4.  These findings can occur in the clinical setting of infection (?   Underlying history of atypical mycobacterial infection).          DAMIAN SON M.D., ATTENDING RADIOLOGIST   This document has been electronically signed. 2019 11:26AM    < end of copied text >

## 2019-08-27 NOTE — PROGRESS NOTE ADULT - SUBJECTIVE AND OBJECTIVE BOX
Patient is a 84y old  Female who presents with a chief complaint of confusion (27 Aug 2019 13:49)      SUBJECTIVE / OVERNIGHT EVENTS: feels better. L side pleuritic chest pain.  Review of Systems  chest pain no  palpitations no  sob no  nausea no  headache no    MEDICATIONS  (STANDING):  ALPRAZolam 0.5 milliGRAM(s) Oral once  amLODIPine   Tablet 5 milliGRAM(s) Oral daily  aspirin enteric coated 81 milliGRAM(s) Oral daily  carvedilol 12.5 milliGRAM(s) Oral every 12 hours  heparin  Injectable 5000 Unit(s) SubCutaneous every 12 hours  lisinopril 20 milliGRAM(s) Oral daily  pantoprazole    Tablet 40 milliGRAM(s) Oral before breakfast  polyethylene glycol 3350 17 Gram(s) Oral daily  sodium chloride 1 Gram(s) Oral two times a day    MEDICATIONS  (PRN):  acetaminophen   Tablet .. 650 milliGRAM(s) Oral every 6 hours PRN Temp greater or equal to 38.5C (101.3F), Mild Pain (1 - 3)      Vital Signs Last 24 Hrs  T(C): 36.7 (27 Aug 2019 12:00), Max: 37.1 (26 Aug 2019 15:08)  T(F): 98.1 (27 Aug 2019 12:00), Max: 98.8 (26 Aug 2019 15:08)  HR: 62 (27 Aug 2019 12:00) (58 - 82)  BP: 131/82 (27 Aug 2019 12:00) (117/57 - 180/82)  BP(mean): --  RR: 18 (27 Aug 2019 12:00) (16 - 20)  SpO2: 98% (27 Aug 2019 12:00) (96% - 99%)    PHYSICAL EXAM:  GENERAL: NAD  HEAD:  Atraumatic, Normocephalic  EYES: EOMI, PERRLA, conjunctiva and sclera clear  NECK: Supple, No JVD  CHEST/LUNG: Clear to auscultation bilaterally; No wheeze L base crackles.  HEART: Regular rate and rhythm; No murmurs, rubs, or gallops  ABDOMEN: Soft, Nontender, Nondistended; Bowel sounds present  EXTREMITIES:  2+ Peripheral Pulses, No clubbing, cyanosis, or edema  PSYCH: Anxious  NEUROLOGY: non-focal  SKIN: No rashes or lesions    LABS:                        11.5   6.00  )-----------( 260      ( 27 Aug 2019 08:47 )             35.0         128<L>  |  94<L>  |  15  ----------------------------<  97  3.8   |  22  |  0.86    Ca    9.4      27 Aug 2019 06:58    TPro  8.1  /  Alb  4.4  /  TBili  0.8  /  DBili  x   /  AST  15  /  ALT  12  /  AlkPhos  57      PT/INR - ( 26 Aug 2019 14:42 )   PT: 13.4 sec;   INR: 1.17 ratio         PTT - ( 26 Aug 2019 14:42 )  PTT:28.4 sec      Urinalysis Basic - ( 26 Aug 2019 18:53 )    Color: Light Yellow / Appearance: Clear / S.027 / pH: x  Gluc: x / Ketone: Small  / Bili: Negative / Urobili: Negative   Blood: x / Protein: Trace / Nitrite: Negative   Leuk Esterase: Negative / RBC: 1 /hpf / WBC 0 /HPF   Sq Epi: x / Non Sq Epi: 1 /hpf / Bacteria: Negative          RADIOLOGY & ADDITIONAL TESTS:    Imaging Personally Reviewed:  < from: Xray Chest 1 View AP/PA (19 @ 08:20) >    IMPRESSION:    Unchanged left lower lobe opacity.    < end of copied text >    Consultant(s) Notes Reviewed:      Care Discussed with Consultants/Other Providers:

## 2019-08-27 NOTE — CONSULT NOTE ADULT - ASSESSMENT
84 year old with PMHx of hypertension and mycobacterium avium-intracellulare who presented with slurred speech, altered mental status, dizziness and generalized weakness for 1 day found to have hypertensive urgency and hyponatremia.    Weight loss and esophageal dilation +/- reported regurgitation (currently denies)  Dilated esophagus on CT Chest 6/2019 - reviewed imaging; esophagus appears dilated down to level where it gets compressed by heart/atrium.  Clinically does not appear to have oropharyngeal symptoms at this time  LLL consolidation reviewed and discussed with ID ?aspiration    Clinically appears stable, no leukocytosis or acute respiratory symptoms    RECS:  -obtain esophagram r/o stricture  -OK for regular diet for now, added PO supplements  -Renal follow up, monitor Na+  -off PEACE therapy as per ID recs  -add PO PPI daily  -Neuro work-up in progress      Discussed with ID and Medicine attendings  Thank you for the courtesy of this consult.    Shahbaz Miranda PA-C    [facility]  [phone]  After hours and weekend coverage [phone] 84 year old with PMHx of hypertension and mycobacterium avium-intracellulare who presented with slurred speech, altered mental status, dizziness and generalized weakness for 1 day found to have hypertensive urgency and hyponatremia.    Weight loss and esophageal dilation +/- reported regurgitation (currently denies)  Dilated esophagus on CT Chest 6/2019 - reviewed imaging; esophagus appears dilated down to level where it gets compressed by heart/atrium.  Clinically does not appear to have oropharyngeal symptoms at this time  LLL consolidation reviewed and discussed with ID ?aspiration    Clinically appears stable, no leukocytosis or acute respiratory symptoms    RECS:  -obtain esophagram r/o stricture  -OK for regular diet for now, added PO supplements  -Renal follow up, monitor Na+  -off PEACE therapy as per ID recs  -add PO PPI daily  -Neuro work-up in progress    Discussed with ID and Medicine attendings  Thank you for the courtesy of this consult.    Shahbaz Miranda PA-C    [facility]  [phone]  After hours and weekend coverage [phone]

## 2019-08-27 NOTE — PHYSICAL THERAPY INITIAL EVALUATION ADULT - PRECAUTIONS/LIMITATIONS, REHAB EVAL
and mixing words up. CT Brain: Mild atrophy appropriate for age, no hemorrhage or mass. Normal CTA head/neck. Neuro c/s ddx including TIA vs infectious/metabolic. CXr 8/27: Unchanged left lower lobe opacity. MRI Head 8/27 (-)

## 2019-08-27 NOTE — CONSULT NOTE ADULT - ASSESSMENT
84 year old with PMHx of hypertension and mycobacterium avium-intracellulare who presented with slurred speech, altered mental status, dizziness and generalized weakness for 1 day found to have hypertensive urgency and hyponatremia. Nephrology consulted for evaluation of hyponatremia.    #Hyponatremia: On presentation, Na 125 with improvement today to 128. Patient given 1L NS. On exam, patient appears euvolemic without AMS. Urine studies with osmolality >300, urine Na >20. Differential: euvolemic hyponatremia vs hypovolemic hyponatremia. Urine studies suggestive of SIADH 2/2 patient's current pulmonary process (PEACE) likely with component of dehydration/poor PO intake. 84 year old with PMHx of hypertension and mycobacterium avium-intracellulare who presented with slurred speech, altered mental status, dizziness and generalized weakness for 1 day found to have hypertensive urgency and hyponatremia. Nephrology consulted for evaluation of hyponatremia.    #Hyponatremia: On presentation, Na 125 with improvement today to 128. Patient given 1L NS. On exam, patient appears euvolemic without AMS. Urine studies with osmolality >300, urine Na >20. Differential: euvolemic hyponatremia vs hypovolemic hyponatremia. Urine studies suggestive of SIADH 2/2 new pulmonary process (PEACE vs malignancy vs infection) likely with component of dehydration/poor PO intake. 84 year old with PMHx of hypertension and mycobacterium avium-intracellulare who presented with slurred speech, altered mental status, dizziness and generalized weakness for 1 day found to have hypertensive urgency, hyponatremia and LLL consolidation. Nephrology consulted for evaluation of hyponatremia.

## 2019-08-27 NOTE — CONSULT NOTE ADULT - PROBLEM SELECTOR RECOMMENDATION 2
Patient presented with hypertensive urgency in setting of changing anti-hypertensive regimen due to interactions with PEACE medications.  - Manage as per cardiology recs and per primary team

## 2019-08-27 NOTE — CONSULT NOTE ADULT - SUBJECTIVE AND OBJECTIVE BOX
[facility] DIVISION OF KIDNEY DISEASES AND HYPERTENSION -- INITIAL CONSULT NOTE  --------------------------------------------------------------------------------  HPI:   This is an 84 year old with PMHx of hypertension and mycobacterium avium-intracellulare who presented with slurred speech, altered mental status, dizziness and generalized weakness upon waking with last known normal the previous night. Patient was in her normal state of health the day prior and complained only of a headache that improved with tylenol. The morning of presentation she states she woke with dizziness and lethargy as well as unsteadiness on her feet. She denies any recent vomiting or diarrhea/constipation. She states she has had loss of appetite over the last year and believes she has lost approximately 20 pounds. She endorses poor PO intake and nausea when attempting to eat. She states she becomes full very quickly. 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 Patient has been known to have PEACE for several years and was being monitored. This year, imaging revealed increase in size of affected area of lung and approximately 2 months ago, treatment was stated. Patient states medications and doses were inconsistent due to interactions with antihypertensive medication. Most recently, patient has been on rifampin, ethambutol and azithromycin, with rifampin added 1-2 weeks ago.    In the ED, patient was found to be afebrile and hypertensive to 180/82. Labs significant for WBC 12.5, Na 125, non-anion gap acidosis, glucose 134. Urine osmolality 322 and urine Na 55. X ray with unchanged LLL consolidation. Code stroke was called and imaging was found to be negative for acute CVA. Patient was given 1L NS and started on anti hypertensives (amlodipine 5, coreg 12.5 bid, lisinopril 20).    Nephrology consulted for hyponatremia. Patient denies any abdominal or flank pain. She endorses poor PO and fluid intake the day prior to admission. She denies any current nausea. She currently denies any dizziness, generalized weakness, or headache. She denies any productive cough today but endorses pain with deep inspiration.        PAST HISTORY  --------------------------------------------------------------------------------  PAST MEDICAL & SURGICAL HISTORY:  PEACE (mycobacterium avium-intracellulare)  HTN (hypertension)  Hyperlipidemia  Appendectomy (approx 15 years ago)    FAMILY HISTORY: FH significant for lymphoma in mother (67) and pancreatic cancer in brother (75).    PAST SOCIAL HISTORY: Patient has distant smoking history in her 30s (1 pack/week for 10 years). She denies drinking or use of other drugs.    ALLERGIES & MEDICATIONS  --------------------------------------------------------------------------------  Allergies    No Known Allergies    Intolerances      Standing Inpatient Medications  amLODIPine   Tablet 5 milliGRAM(s) Oral daily  aspirin enteric coated 81 milliGRAM(s) Oral daily  carvedilol 12.5 milliGRAM(s) Oral every 12 hours  heparin  Injectable 5000 Unit(s) SubCutaneous every 12 hours  lisinopril 20 milliGRAM(s) Oral daily  polyethylene glycol 3350 17 Gram(s) Oral daily    PRN Inpatient Medications  acetaminophen   Tablet .. 650 milliGRAM(s) Oral every 6 hours PRN      REVIEW OF SYSTEMS  --------------------------------------------------------------------------------  Gen: No fevers/chills  Head/Eyes/Ears/Mouth: No headache; Normal hearing; Normal vision    Respiratory: No dyspnea, +productive cough, +pain on deep inspiration  CV: No chest pain  GI: No abdominal pain, diarrhea, constipation, vomiting, +nausea with eating  : No increased frequency, dysuria  MSK: No joint pain/swelling; no edema    All other systems were reviewed and are negative, except as noted.    VITALS/PHYSICAL EXAM  --------------------------------------------------------------------------------  T(C): 36.6 (08-27-19 @ 05:47), Max: 37.1 (08-26-19 @ 15:08)  HR: 58 (08-27-19 @ 05:47) (58 - 82)  BP: 117/57 (08-27-19 @ 05:47) (117/57 - 180/82)  RR: 18 (08-27-19 @ 05:47) (16 - 20)  SpO2: 96% (08-27-19 @ 05:47) (96% - 99%)  Wt(kg): --  Height (cm): 165.1 (08-26-19 @ 14:29)  Weight (kg): 44 (08-26-19 @ 14:29)  BMI (kg/m2): 16.1 (08-26-19 @ 14:29)  BSA (m2): 1.45 (08-26-19 @ 14:29)      Physical Exam:    	Gen: frail, cachectic appearing woman in no acute distress  	HEENT: PERRL, supple neck, moist oral mucosa  	Pulm: CTA B/L  	CV: RRR, S1S2; no rub  	Abd: +BS, soft, nontender/nondistended       	: No suprapubic tenderness  	LE: Warm, no clubbing, intact strength; no edema  	Neuro: No focal deficits, AOX3, no asterixis      	Vascular Access: none      LABS/STUDIES  --------------------------------------------------------------------------------              11.5   6.00  >-----------<  260      [08-27-19 @ 08:47]              35.0     128  |  94  |  15  ----------------------------<  97      [08-27-19 @ 06:58]  3.8   |  22  |  0.86        Ca     9.4     [08-27-19 @ 06:58]    TPro  8.1  /  Alb  4.4  /  TBili  0.8  /  DBili  x   /  AST  15  /  ALT  12  /  AlkPhos  57  [08-26-19 @ 14:42]    PT/INR: PT 13.4 , INR 1.17       [08-26-19 @ 14:42]  PTT: 28.4       [08-26-19 @ 14:42]      Creatinine Trend:  SCr 0.86 [08-27 @ 06:58]  SCr 0.99 [08-26 @ 14:42]    Urinalysis - [08-26-19 @ 18:53]      Color Light Yellow / Appearance Clear / SG 1.027 / pH 6.5      Gluc Negative / Ketone Small  / Bili Negative / Urobili Negative       Blood Negative / Protein Trace / Leuk Est Negative / Nitrite Negative      RBC 1 / WBC 0 / Hyaline 0 / Gran  / Sq Epi  / Non Sq Epi 1 / Bacteria Negative    Urine Creatinine 30      [08-26-19 @ 18:53]  Urine Sodium 55      [08-26-19 @ 18:53]  Urine Osmolality 322      [08-26-19 @ 18:53]    TSH 3.50      [08-27-19 @ 08:26]    CT Angio Neck w/ IV Cont (08.26.19 @ 15:21):  INTERPRETATION:    Clinical Indication:Code stroke, dysarthria and confusion    5 mm axial sections of the brain were obtained from base to vertex,   without the intravenous administration of contrast material.  Coronal and sagittal computer generated reconstructed views are available.    No prior brain imaging is available for comparison.    The ventricles and sulci are prominent consistent age appropriate   involutional changes. Small vessel white matter ischemic changes are   noted. There is no hemorrhage, mass, or shift of midlinestructures. Bone   window examination is unremarkable. There has been previous bilateral   lens replacement surgery        After the intravenous power injection of 70 cc of Omnipaque 300 using a   bolus doron timing run, serial thin sections were obtained through the   neck from the thoracic inlet through the the intracranial circulation   centered at the [id number]-Wbgkiw on a 64 slice CT scanner reformatted   with coronal and sagittal 2 D-MIP projections, including 3 D   reconstructions using a separate 3D [employer] software workstation. A total   of 70 cc of Omnipaque were intravenously injected. 30 cc were discarded    The are origins of the carotid vertebral arteries are normal. There is no   significant carotid stenosis. The left vertebral artery is dominant. The   right vertebral artery ends distal to the PICA.      The distal vertebral arteries are well identified as are the   posterior-inferior cerebellar arteries bilaterally. The region of the   vertebral basilar junction is normal.The basilar artery is normal. The   posterior cerebral and superior cerebellar arteries are normal.    Evaluation of the carotid arteries demonstrate normal appearance to the   distal cervical, petrous, cavernous and supraclinoid internal carotid   arteries. The anterior cerebral arteries and anterior commuting artery   are visualized. 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 The right A1 segment of the anterior cerebral artery   is hypoplastic compared with the left which is dominant. The middle   cerebral arteries are normal.    There is no evidence of aneurysm, stenosis, or vessel occlusion.      The normal intracranial venous circulation is identified. The right   transverse sinus is dominant. The superior sagittal sinus, internal   cerebral veins, vein of Bandar, straight sinus, transverse sinuses,   sigmoid sinuses and internal jugular veins are normal. Cortical veins are   normal.    The regional soft tissues of the neck are within normal limits.      IMPRESSION: Mild atrophy appropriate for age. No hemorrhage or mass.   Normal CTA of the head and neck. [facility] DIVISION OF KIDNEY DISEASES AND HYPERTENSION -- INITIAL CONSULT NOTE  --------------------------------------------------------------------------------  HPI:   This is an 84 year old with PMHx of hypertension and mycobacterium avium-intracellulare who presented with slurred speech, altered mental status, dizziness and generalized weakness upon waking with last known normal the previous night. Patient was in her normal state of health the day prior and complained only of a headache that improved with tylenol. The morning of presentation she states she woke with dizziness and lethargy as well as unsteadiness on her feet. She denies any recent vomiting or diarrhea/constipation. She states she has had loss of appetite over the last year and believes she has lost approximately 20 pounds. She endorses poor PO intake and nausea when attempting to eat. She states she becomes full very quickly. 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 Patient has been known to have PEACE for several years and was being monitored. This year, imaging revealed increase in size of affected area of lung and approximately 2 months ago, treatment was stated. Patient states medications and doses were inconsistent due to interactions with antihypertensive medication. Most recently, patient has been on rifampin, ethambutol and azithromycin, with rifampin added 1-2 weeks ago.    In the ED, patient was found to be afebrile and hypertensive to 180/82. Labs significant for WBC 12.5, Na 125, non-anion gap acidosis, glucose 134. Urine osmolality 322 and urine Na 55. Code stroke was called and imaging was found to be negative for acute CVA. Patient was given 1L NS and started on anti hypertensives (amlodipine 5, coreg 12.5 bid, lisinopril 20).    Nephrology consulted for hyponatremia. Patient denies any abdominal or flank pain. She endorses poor PO and fluid intake the day prior to admission. She denies any current nausea. She currently denies any dizziness, generalized weakness, or headache. She denies any productive cough today but endorses pain with deep inspiration.        PAST HISTORY  --------------------------------------------------------------------------------  PAST MEDICAL & SURGICAL HISTORY:  PEACE (mycobacterium avium-intracellulare)  HTN (hypertension)  Hyperlipidemia  Appendectomy (approx 15 years ago)    FAMILY HISTORY: FH significant for lymphoma in mother (67) and pancreatic cancer in brother (75).    PAST SOCIAL HISTORY: Patient has distant smoking history in her 30s (1 pack/week for 10 years). She denies drinking or use of other drugs.    ALLERGIES & MEDICATIONS  --------------------------------------------------------------------------------  Allergies    No Known Allergies    Intolerances      Standing Inpatient Medications  amLODIPine   Tablet 5 milliGRAM(s) Oral daily  aspirin enteric coated 81 milliGRAM(s) Oral daily  carvedilol 12.5 milliGRAM(s) Oral every 12 hours  heparin  Injectable 5000 Unit(s) SubCutaneous every 12 hours  lisinopril 20 milliGRAM(s) Oral daily  polyethylene glycol 3350 17 Gram(s) Oral daily    PRN Inpatient Medications  acetaminophen   Tablet .. 650 milliGRAM(s) Oral every 6 hours PRN      REVIEW OF SYSTEMS  --------------------------------------------------------------------------------  Gen: No fevers/chills  Head/Eyes/Ears/Mouth: No headache; Normal hearing; Normal vision    Respiratory: No dyspnea, +productive cough, +pain on deep inspiration  CV: No chest pain  GI: No abdominal pain, diarrhea, constipation, vomiting, +nausea with eating  : No increased frequency, dysuria  MSK: No joint pain/swelling; no edema    All other systems were reviewed and are negative, except as noted.    VITALS/PHYSICAL EXAM  --------------------------------------------------------------------------------  T(C): 36.6 (08-27-19 @ 05:47), Max: 37.1 (08-26-19 @ 15:08)  HR: 58 (08-27-19 @ 05:47) (58 - 82)  BP: 117/57 (08-27-19 @ 05:47) (117/57 - 180/82)  RR: 18 (08-27-19 @ 05:47) (16 - 20)  SpO2: 96% (08-27-19 @ 05:47) (96% - 99%)  Wt(kg): --  Height (cm): 165.1 (08-26-19 @ 14:29)  Weight (kg): 44 (08-26-19 @ 14:29)  BMI (kg/m2): 16.1 (08-26-19 @ 14:29)  BSA (m2): 1.45 (08-26-19 @ 14:29)      Physical Exam:    	Gen: frail, cachectic appearing woman in no acute distress  	HEENT: PERRL, supple neck, moist oral mucosa  	Pulm: CTA B/L  	CV: RRR, S1S2; no rub  	Abd: +BS, soft, nontender/nondistended       	: No suprapubic tenderness  	LE: Warm, no clubbing, intact strength; no edema  	Neuro: No focal deficits, AOX3, no asterixis      	Vascular Access: none      LABS/STUDIES  --------------------------------------------------------------------------------              11.5   6.00  >-----------<  260      [08-27-19 @ 08:47]              35.0     128  |  94  |  15  ----------------------------<  97      [08-27-19 @ 06:58]  3.8   |  22  |  0.86        Ca     9.4     [08-27-19 @ 06:58]    TPro  8.1  /  Alb  4.4  /  TBili  0.8  /  DBili  x   /  AST  15  /  ALT  12  /  AlkPhos  57  [08-26-19 @ 14:42]    PT/INR: PT 13.4 , INR 1.17       [08-26-19 @ 14:42]  PTT: 28.4       [08-26-19 @ 14:42]      Creatinine Trend:  SCr 0.86 [08-27 @ 06:58]  SCr 0.99 [08-26 @ 14:42]    Urinalysis - [08-26-19 @ 18:53]      Color Light Yellow / Appearance Clear / SG 1.027 / pH 6.5      Gluc Negative / Ketone Small  / Bili Negative / Urobili Negative       Blood Negative / Protein Trace / Leuk Est Negative / Nitrite Negative      RBC 1 / WBC 0 / Hyaline 0 / Gran  / Sq Epi  / Non Sq Epi 1 / Bacteria Negative    Urine Creatinine 30      [08-26-19 @ 18:53]  Urine Sodium 55      [08-26-19 @ 18:53]  Urine Osmolality 322      [08-26-19 @ 18:53]    TSH 3.50      [08-27-19 @ 08:26]    CT Angio Neck w/ IV Cont (08.26.19 @ 15:21):  INTERPRETATION:    Clinical Indication:Code stroke, dysarthria and confusion    5 mm axial sections of the brain were obtained from base to vertex,   without the intravenous administration of contrast material.  Coronal and sagittal computer generated reconstructed views are available.    No prior brain imaging is available for comparison.    The ventricles and sulci are prominent consistent age appropriate   involutional changes. Small vessel white matter ischemic changes are   noted. There is no hemorrhage, mass, or shift of midlinestructures. Bone   window examination is unremarkable. There has been previous bilateral   lens replacement surgery        After the intravenous power injection of 70 cc of Omnipaque 300 using a   bolus doron timing run, serial thin sections were obtained through the   neck from the thoracic inlet through the the intracranial circulation   centered at the [id number] on a 64 slice CT scanner reformatted   with coronal and sagittal 2 D-MIP projections, including 3 D   reconstructions using a separate 3D [employer] software workstation. A total   of 70 cc of Omnipaque were intravenously injected. 30 cc were discarded    The are origins of the carotid vertebral arteries are normal. There is no   significant carotid stenosis. The left vertebral artery is dominant. The   right vertebral artery ends distal to the PICA.      The distal vertebral arteries are well identified as are the   posterior-inferior cerebellar arteries bilaterally. The region of the   vertebral basilar junction is normal.The basilar artery is normal. The   posterior cerebral and superior cerebellar arteries are normal.    Evaluation of the carotid arteries demonstrate normal appearance to the   distal cervical, petrous, cavernous and supraclinoid internal carotid   arteries. The anterior cerebral arteries and anterior commuting artery   are visualized. 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 The right A1 segment of the anterior cerebral artery   is hypoplastic compared with the left which is dominant. The middle   cerebral arteries are normal.    There is no evidence of aneurysm, stenosis, or vessel occlusion.      The normal intracranial venous circulation is identified. The right   transverse sinus is dominant. The superior sagittal sinus, internal   cerebral veins, vein of Bandar, straight sinus, transverse sinuses,   sigmoid sinuses and internal jugular veins are normal. Cortical veins are   normal.    The regional soft tissues of the neck are within normal limits.      IMPRESSION: Mild atrophy appropriate for age. No hemorrhage or mass.   Normal CTA of the head and neck. [facility] DIVISION OF KIDNEY DISEASES AND HYPERTENSION -- INITIAL CONSULT NOTE  --------------------------------------------------------------------------------  HPI:   This is an 84 year old with PMHx of hypertension and mycobacterium avium-intracellulare who presented with slurred speech, altered mental status, dizziness and generalized weakness upon waking with last known normal the previous night. Patient was in her normal state of health the day prior and complained only of a headache that improved with tylenol. The morning of presentation she states she woke with dizziness and lethargy as well as unsteadiness on her feet. She denies any recent vomiting or diarrhea/constipation. She states she has had loss of appetite over the last year and believes she has lost approximately 20 pounds. She endorses poor PO intake and nausea when attempting to eat. She states she becomes full very quickly. 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 Patient has been known to have PEACE for several years and was being monitored. This year, imaging revealed increase in size of affected area of lung and approximately 2 months ago, treatment was stated. Patient states medications and doses were inconsistent due to interactions with antihypertensive medication. Most recently, patient has been on rifampin, ethambutol and azithromycin, with rifampin added 1-2 weeks ago.    In the ED, patient was found to be afebrile and hypertensive to 180/82. Labs significant for WBC 12.5, Na 125, non-anion gap acidosis, glucose 134. Urine osmolality 322 and urine Na 55. Code stroke was called and imaging was found to be negative for acute CVA. CT chest revealed new LLL consolidation and opacification of segmental bronchi of LLL as compared to prior imaging in 7/2018. Patient was given 1L NS and started on anti hypertensives (amlodipine 5, coreg 12.5 bid, lisinopril 20).    Nephrology consulted for hyponatremia. Patient denies any abdominal or flank pain. She endorses poor PO and fluid intake the day prior to admission. She denies any current nausea. She currently denies any dizziness, generalized weakness, or headache. She denies any productive cough today but endorses pain with deep inspiration. Patient has recently been taking dyazide (triamterene-hydrochlorothiazide) to control her blood pressures.        PAST HISTORY  --------------------------------------------------------------------------------  PAST MEDICAL & SURGICAL HISTORY:  PEACE (mycobacterium avium-intracellulare)  HTN (hypertension)  Hyperlipidemia  Appendectomy (approx 15 years ago)    FAMILY HISTORY: FH significant for lymphoma in mother (67) and pancreatic cancer in brother (75).    PAST SOCIAL HISTORY: Patient has distant smoking history in her 30s (1 pack/week for 10 years). She denies drinking or use of other drugs.    ALLERGIES & MEDICATIONS  --------------------------------------------------------------------------------  Allergies    No Known Allergies    Intolerances      Standing Inpatient Medications  amLODIPine   Tablet 5 milliGRAM(s) Oral daily  aspirin enteric coated 81 milliGRAM(s) Oral daily  carvedilol 12.5 milliGRAM(s) Oral every 12 hours  heparin  Injectable 5000 Unit(s) SubCutaneous every 12 hours  lisinopril 20 milliGRAM(s) Oral daily  polyethylene glycol 3350 17 Gram(s) Oral daily    PRN Inpatient Medications  acetaminophen   Tablet .. 650 milliGRAM(s) Oral every 6 hours PRN      REVIEW OF SYSTEMS  --------------------------------------------------------------------------------  Gen: No fevers/chills  Head/Eyes/Ears/Mouth: No headache; Normal hearing; Normal vision    Respiratory: No dyspnea, +productive cough, +pain on deep inspiration  CV: No chest pain  GI: No abdominal pain, diarrhea, constipation, vomiting, +nausea with eating  : No increased frequency, dysuria  MSK: No joint pain/swelling; no edema    All other systems were reviewed and are negative, except as noted.    VITALS/PHYSICAL EXAM  --------------------------------------------------------------------------------  T(C): 36.6 (08-27-19 @ 05:47), Max: 37.1 (08-26-19 @ 15:08)  HR: 58 (08-27-19 @ 05:47) (58 - 82)  BP: 117/57 (08-27-19 @ 05:47) (117/57 - 180/82)  RR: 18 (08-27-19 @ 05:47) (16 - 20)  SpO2: 96% (08-27-19 @ 05:47) (96% - 99%)  Wt(kg): --  Height (cm): 165.1 (08-26-19 @ 14:29)  Weight (kg): 44 (08-26-19 @ 14:29)  BMI (kg/m2): 16.1 (08-26-19 @ 14:29)  BSA (m2): 1.45 (08-26-19 @ 14:29)      Physical Exam:    	Gen: frail, cachectic appearing woman in no acute distress  	HEENT: PERRL, supple neck, moist oral mucosa  	Pulm: CTA B/L  	CV: RRR, S1S2; no rub  	Abd: +BS, soft, nontender/nondistended       	: No suprapubic tenderness  	LE: Warm, no clubbing, intact strength; no edema  	Neuro: No focal deficits, AOX3, no asterixis      	Vascular Access: none      LABS/STUDIES  --------------------------------------------------------------------------------              11.5   6.00  >-----------<  260      [08-27-19 @ 08:47]              35.0     128  |  94  |  15  ----------------------------<  97      [08-27-19 @ 06:58]  3.8   |  22  |  0.86        Ca     9.4     [08-27-19 @ 06:58]    TPro  8.1  /  Alb  4.4  /  TBili  0.8  /  DBili  x   /  AST  15  /  ALT  12  /  AlkPhos  57  [08-26-19 @ 14:42]    PT/INR: PT 13.4 , INR 1.17       [08-26-19 @ 14:42]  PTT: 28.4       [08-26-19 @ 14:42]      Creatinine Trend:  SCr 0.86 [08-27 @ 06:58]  SCr 0.99 [08-26 @ 14:42]    Urinalysis - [08-26-19 @ 18:53]      Color Light Yellow / Appearance Clear / SG 1.027 / pH 6.5      Gluc Negative / Ketone Small  / Bili Negative / Urobili Negative       Blood Negative / Protein Trace / Leuk Est Negative / Nitrite Negative      RBC 1 / WBC 0 / Hyaline 0 / Gran  / Sq Epi  / Non Sq Epi 1 / Bacteria Negative    Urine Creatinine 30      [08-26-19 @ 18:53]  Urine Sodium 55      [08-26-19 @ 18:53]  Urine Osmolality 322      [08-26-19 @ 18:53]    TSH 3.50      [08-27-19 @ 08:26]    CT Angio Neck w/ IV Cont (08.26.19 @ 15:21):  INTERPRETATION:    Clinical Indication:Code stroke, dysarthria and confusion    5 mm axial sections of the brain were obtained from base to vertex,   without the intravenous administration of contrast material.  Coronal and sagittal computer generated reconstructed views are available.    No prior brain imaging is available for comparison.    The ventricles and sulci are prominent consistent age appropriate   involutional changes. Small vessel white matter ischemic changes are   noted. There is no hemorrhage, mass, or shift of midlinestructures. Bone   window examination is unremarkable. There has been previous bilateral   lens replacement surgery        After the intravenous power injection of 70 cc of Omnipaque 300 using a   bolus doron timing run, serial thin sections were obtained through the   neck from the thoracic inlet through the the intracranial circulation   centered at the [id number]-yr-Gircam on a 64 slice CT scanner reformatted   with coronal and sagittal 2 D-MIP projections, including 3 D   reconstructions using a separate 3D [employer] software workstation. A total   of 70 cc of Omnipaque were intravenously injected. 30 cc were discarded    The are origins of the carotid vertebral arteries are normal. There is no   significant carotid stenosis. The left vertebral artery is dominant. The   right vertebral artery ends distal to the PICA.      The distal vertebral arteries are well identified as are the   posterior-inferior cerebellar arteries bilaterally. The region of the   vertebral basilar junction is normal.The basilar artery is normal. The   posterior cerebral and superior cerebellar arteries are normal.    Evaluation of the carotid arteries demonstrate normal appearance to the   distal cervical, petrous, cavernous and supraclinoid internal carotid   arteries. The anterior cerebral arteries and anterior commuting artery   are visualized. 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 The right A1 segment of the anterior cerebral artery   is hypoplastic compared with the left which is dominant. The middle   cerebral arteries are normal.    There is no evidence of aneurysm, stenosis, or vessel occlusion.      The normal intracranial venous circulation is identified. The right   transverse sinus is dominant. The superior sagittal sinus, internal   cerebral veins, vein of Bandar, straight sinus, transverse sinuses,   sigmoid sinuses and internal jugular veins are normal. Cortical veins are   normal.    The regional soft tissues of the neck are within normal limits.      IMPRESSION: Mild atrophy appropriate for age. No hemorrhage or mass.   Normal CTA of the head and neck.

## 2019-08-27 NOTE — CONSULT NOTE ADULT - SUBJECTIVE AND OBJECTIVE BOX
PULM/MED PROGRESS NOTE              HPI:  83yo F presents with slurred speech / weakness starting at 9am when she woke up. Last known normal was 12pm last night. Code stroke was called. Generalized weakness, patient was confused as well. Recent started Rx for PEACE with Rifampin, Azithromycin and Ethambutol . (26 Aug 2019 20:31)      MEDICATIONS  (STANDING):  amLODIPine   Tablet 5 milliGRAM(s) Oral daily  aspirin enteric coated 81 milliGRAM(s) Oral daily  carvedilol 12.5 milliGRAM(s) Oral every 12 hours  heparin  Injectable 5000 Unit(s) SubCutaneous every 12 hours  lisinopril 20 milliGRAM(s) Oral daily    MEDICATIONS  (PRN):  acetaminophen   Tablet .. 650 milliGRAM(s) Oral every 6 hours PRN Temp greater or equal to 38.5C (101.3F), Mild Pain (1 - 3)      Allergies    No Known Allergies    Intolerances        PAST MEDICAL & SURGICAL HISTORY:  PEACE (mycobacterium avium-intracellulare)  HTN (hypertension)      FAMILY HISTORY:      SOCIAL HISTORY  Smoking History:   Alcohol:  Drugs:  Occupation:    REVIEW OF SYSTEMS:    CONSTITUTIONAL: No weakness, fevers or chills  EYES/ENT: No visual changes;  No vertigo or throat pain   NECK: No pain or stiffness  RESPIRATORY: No cough, wheezing, hemoptysis; No shortness of breath  CARDIOVASCULAR: No chest pain or palpitations  GASTROINTESTINAL: No abdominal or epigastric pain. No nausea, vomiting, or hematemesis; No diarrhea or constipation. No melena or hematochezia.  GENITOURINARY: No dysuria, frequency or hematuria  NEUROLOGICAL: No numbness or weakness  SKIN: No itching, burning, rashes, or lesions   All other review of systems is negative unless indicated above.    PHYSICAL EXAM:  Vital Signs Last 24 Hrs  T(C): 36.6 (27 Aug 2019 05:47), Max: 37.1 (26 Aug 2019 15:08)  T(F): 97.9 (27 Aug 2019 05:47), Max: 98.8 (26 Aug 2019 15:08)  HR: 58 (27 Aug 2019 05:47) (58 - 82)  BP: 117/57 (27 Aug 2019 05:47) (117/57 - 180/82)  BP(mean): --  RR: 18 (27 Aug 2019 05:47) (16 - 20)  SpO2: 96% (27 Aug 2019 05:47) (96% - 99%)  GENERAL: NAD, well-groomed, well-developed  HEAD:  Atraumatic, Normocephalic  EYES: EOMI, PERRLA, conjunctiva and sclera clear  ENMT: No tonsillar erythema, exudates, or enlargement; Moist mucous membranes, Good dentition, No lesions  NECK: Supple, No JVD, Normal thyroid  NERVOUS SYSTEM:  Alert & Oriented X3, Good concentration; Motor Strength 5/5 B/L upper and lower extremities; DTRs 2+ intact and symmetric  CHEST/LUNG: Clear to percussion bilaterally; No rales, rhonchi, wheezing, or rubs  HEART: Regular rate and rhythm; No murmurs, rubs, or gallops  ABDOMEN: Soft, Nontender, Nondistended; Bowel sounds present  EXTREMITIES:  2+ Peripheral Pulses, No clubbing, cyanosis, or edema  LYMPH: No lymphadenopathy noted  SKIN: No rashes or lesions      LABS:                        11.5   6.00  )-----------( 260      ( 27 Aug 2019 08:47 )             35.0     08-27    128<L>  |  94<L>  |  15  ----------------------------<  97  3.8   |  22  |  0.86    Ca    9.4      27 Aug 2019 06:58    TPro  8.1  /  Alb  4.4  /  TBili  0.8  /  DBili  x   /  AST  15  /  ALT  12  /  AlkPhos  57  08-26    PT/INR - ( 26 Aug 2019 14:42 )   PT: 13.4 sec;   INR: 1.17 ratio         PTT - ( 26 Aug 2019 14:42 )  PTT:28.4 sec  Urinalysis Basic - ( 26 Aug 2019 18:53 )    Color: Light Yellow / Appearance: Clear / S.027 / pH: x  Gluc: x / Ketone: Small  / Bili: Negative / Urobili: Negative   Blood: x / Protein: Trace / Nitrite: Negative   Leuk Esterase: Negative / RBC: 1 /hpf / WBC 0 /HPF   Sq Epi: x / Non Sq Epi: 1 /hpf / Bacteria: Negative                        Microbiology      RADIOLOGY & ADDITIONAL STUDIES:    Xray:    CTScan:    Echo:    Assessment:    Plan: PULM/MED PROGRESS NOTE    The patient's chest CT in  showed increased opacification of the left lower lobe.  Sputum cultures revealed PEACE (done through [facility])  She was begun on three drug treatment in a stepwise fashion - initially azithromycing, then ethambutol, and lastly with low doses of rifampin.  This interacted with her BP meds, presumably amlodipine.   The patients BP meds were adjusted  - increased carvedilol, lisnopril added, along with Dyazide.     The patient had a second pulmonary opinion with Dr. Foley at [place] who agreed with the treatment regimen.     The patient had some coughing - somehwhat improved along with low grade temps over the past few weeks.    '              Hx from chart:    HPI:  85yo F presents with slurred speech / weakness starting at 9am when she woke up. Last known normal was 12pm last night. Code stroke was called. Generalized weakness, patient was confused as well. Recent started Rx for PEACE with Rifampin, Azithromycin and Ethambutol . (26 Aug 2019 20:31)      MEDICATIONS  (STANDING):  amLODIPine   Tablet 5 milliGRAM(s) Oral daily  aspirin enteric coated 81 milliGRAM(s) Oral daily  carvedilol 12.5 milliGRAM(s) Oral every 12 hours  heparin  Injectable 5000 Unit(s) SubCutaneous every 12 hours  lisinopril 20 milliGRAM(s) Oral daily    MEDICATIONS  (PRN):  acetaminophen   Tablet .. 650 milliGRAM(s) Oral every 6 hours PRN Temp greater or equal to 38.5C (101.3F), Mild Pain (1 - 3)      Allergies    No Known Allergies    Intolerances        PAST MEDICAL & SURGICAL HISTORY:  PEACE (mycobacterium avium-intracellulare)  HTN (hypertension)            SOCIAL HISTORY  Smoking History: former smoker      PHYSICAL EXAM:  Vital Signs Last 24 Hrs  T(C): 36.6 (27 Aug 2019 05:47), Max: 37.1 (26 Aug 2019 15:08)  T(F): 97.9 (27 Aug 2019 05:47), Max: 98.8 (26 Aug 2019 15:08)  HR: 58 (27 Aug 2019 05:47) (58 - 82)  BP: 117/57 (27 Aug 2019 05:47) (117/57 - 180/82)  BP(mean): --  RR: 18 (27 Aug 2019 05:47) (16 - 20)  SpO2: 96% (27 Aug 2019 05:47) (96% - 99%)  GENERAL: NAD  NECK: Supple,  NERVOUS SYSTEM:  Alert & Oriented X3, Good concentration; Motor Strength 5/5 B/L upper and lower extremities;   CHEST/LUNG: Clear to percussion bilaterally; No rales, rhonchi, wheezing, or rubs  HEART: Regular rate/rhythm  EXTREMITIES:  No edema        LABS:                        11.5   6.00  )-----------( 260      ( 27 Aug 2019 08:47 )             35.0     08-    128<L>  |  94<L>  |  15  ----------------------------<  97  3.8   |  22  |  0.86    Ca    9.4      27 Aug 2019 06:58    TPro  8.1  /  Alb  4.4  /  TBili  0.8  /  DBili  x   /  AST  15  /  ALT  12  /  AlkPhos  57  08-    PT/INR - ( 26 Aug 2019 14:42 )   PT: 13.4 sec;   INR: 1.17 ratio         PTT - ( 26 Aug 2019 14:42 )  PTT:28.4 sec  Urinalysis Basic - ( 26 Aug 2019 18:53 )    Color: Light Yellow / Appearance: Clear / S.027 / pH: x  Gluc: x / Ketone: Small  / Bili: Negative / Urobili: Negative   Blood: x / Protein: Trace / Nitrite: Negative   Leuk Esterase: Negative / RBC: 1 /hpf / WBC 0 /HPF   Sq Epi: x / Non Sq Epi: 1 /hpf / Bacteria: Negative      RADIOLOGY & ADDITIONAL STUDIES:    Xray:   EXAM:  XR CHEST AP OR PA 1V                            PROCEDURE DATE:  2019            INTERPRETATION:    CLINICAL INDICATION: PEACE infection    TECHNIQUE: Portable frontal view of the chest.    COMPARISON: Frontal chest radiograph from 2017.    FINDINGS:     Cardiac silhouette is within normal limits.  Unchanged left lower lobe opacity silhouetting the left hemidiaphragm.  No pleural effusions or pneumothorax.  No acute osseous abnormality.      IMPRESSION:    Unchanged left lower lobe opacity.                ASHLEY MENCHACA M.D., RADIOLOGY RESIDENT  This document has been electronically signed.  CARLOTTA CARBALLO M.D., ATTENDING RADIOLOGIST  This document has been electronically signed. Aug 27 2019 11:29AM

## 2019-08-27 NOTE — PHYSICAL THERAPY INITIAL EVALUATION ADULT - PERTINENT HX OF CURRENT PROBLEM, REHAB EVAL
85 yo R-handed White woman with a PMH of HTN and PEACE on rifampin pw 08-26-19 as a code stroke for confusion, dysarthria and word-finding difficulties with LKW of 12:00 am 08/26/19. Pts  reports pt woke up at 09:00 and when she started talking he noted confusion and speech difficulty manifest as slurring of speech

## 2019-08-27 NOTE — CONSULT NOTE ADULT - ATTENDING COMMENTS
I have seen this patient with the fellow and agree with their assessment and plan. In addition,    #Hyponatremia - multifactorial (low solute intake, SIADH + thiazide use). 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 Nausea/vomiting is likely the stimulant of ADH. Encourage po intake and protein supplementation. We will start salt tab 1 g bid.     #Hypertension - patient's blood pressure is controlled with amlodipine, carvedilol and lisinopril. Agree with holding dyazide (triamterene/hctz).     The rest of the recommendations as per medical student's note.    Nova Cheatham MD  Attending Nephrologist  [phone] I have seen this patient with the fellow and agree with their assessment and plan. In addition,    #Hyponatremia - multifactorial (low solute intake, SIADH + thiazide use). 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 Nausea/vomiting is likely the stimulant of ADH. Encourage po intake and protein supplementation. We will start salt tab 1 g bid.     #Hypertension - patient's blood pressure is controlled with amlodipine, carvedilol and lisinopril. Agree with holding dyazide (triamterene/hctz).    # Nausea/vomiting - strong trigger for ADH release. To continue symptom control.      The rest of the recommendations as per medical student's note.    Nova Cheatham MD  Attending Nephrologist  [phone]

## 2019-08-27 NOTE — CONSULT NOTE ADULT - ASSESSMENT
Neuro sx have resolved - presumably from elevated BP  Pt was on rx for PEACE      Suggest:    ID consult - Dr. PREMA Casillas  Neuro consult called   Once stable consider restarting meds for PEACE, with treatment of BP  F/U chest CT

## 2019-08-28 LAB
ANION GAP SERPL CALC-SCNC: 10 MMOL/L — SIGNIFICANT CHANGE UP (ref 5–17)
BUN SERPL-MCNC: 20 MG/DL — SIGNIFICANT CHANGE UP (ref 7–23)
CALCIUM SERPL-MCNC: 9.1 MG/DL — SIGNIFICANT CHANGE UP (ref 8.4–10.5)
CHLORIDE SERPL-SCNC: 97 MMOL/L — SIGNIFICANT CHANGE UP (ref 96–108)
CO2 SERPL-SCNC: 26 MMOL/L — SIGNIFICANT CHANGE UP (ref 22–31)
CREAT SERPL-MCNC: 0.93 MG/DL — SIGNIFICANT CHANGE UP (ref 0.5–1.3)
GLUCOSE SERPL-MCNC: 96 MG/DL — SIGNIFICANT CHANGE UP (ref 70–99)
HCT VFR BLD CALC: 35.9 % — SIGNIFICANT CHANGE UP (ref 34.5–45)
HGB BLD-MCNC: 11.5 G/DL — SIGNIFICANT CHANGE UP (ref 11.5–15.5)
MCHC RBC-ENTMCNC: 29.3 PG — SIGNIFICANT CHANGE UP (ref 27–34)
MCHC RBC-ENTMCNC: 32 GM/DL — SIGNIFICANT CHANGE UP (ref 32–36)
MCV RBC AUTO: 91.6 FL — SIGNIFICANT CHANGE UP (ref 80–100)
OSMOLALITY UR: 583 MOSM/KG — SIGNIFICANT CHANGE UP (ref 50–1200)
PLATELET # BLD AUTO: 261 K/UL — SIGNIFICANT CHANGE UP (ref 150–400)
POTASSIUM SERPL-MCNC: 4.4 MMOL/L — SIGNIFICANT CHANGE UP (ref 3.5–5.3)
POTASSIUM SERPL-SCNC: 4.4 MMOL/L — SIGNIFICANT CHANGE UP (ref 3.5–5.3)
RBC # BLD: 3.92 M/UL — SIGNIFICANT CHANGE UP (ref 3.8–5.2)
RBC # FLD: 13.3 % — SIGNIFICANT CHANGE UP (ref 10.3–14.5)
SODIUM SERPL-SCNC: 133 MMOL/L — LOW (ref 135–145)
WBC # BLD: 6.79 K/UL — SIGNIFICANT CHANGE UP (ref 3.8–10.5)
WBC # FLD AUTO: 6.79 K/UL — SIGNIFICANT CHANGE UP (ref 3.8–10.5)

## 2019-08-28 PROCEDURE — 99233 SBSQ HOSP IP/OBS HIGH 50: CPT

## 2019-08-28 PROCEDURE — 74220 X-RAY XM ESOPHAGUS 1CNTRST: CPT | Mod: 26

## 2019-08-28 PROCEDURE — 99232 SBSQ HOSP IP/OBS MODERATE 35: CPT

## 2019-08-28 RX ORDER — CARVEDILOL PHOSPHATE 80 MG/1
6.25 CAPSULE, EXTENDED RELEASE ORAL EVERY 12 HOURS
Refills: 0 | Status: DISCONTINUED | OUTPATIENT
Start: 2019-08-28 | End: 2019-08-29

## 2019-08-28 RX ORDER — LISINOPRIL 2.5 MG/1
20 TABLET ORAL DAILY
Refills: 0 | Status: DISCONTINUED | OUTPATIENT
Start: 2019-08-28 | End: 2019-08-29

## 2019-08-28 RX ORDER — ALPRAZOLAM 0.25 MG
0.25 TABLET ORAL ONCE
Refills: 0 | Status: DISCONTINUED | OUTPATIENT
Start: 2019-08-28 | End: 2019-08-28

## 2019-08-28 RX ADMIN — HEPARIN SODIUM 5000 UNIT(S): 5000 INJECTION INTRAVENOUS; SUBCUTANEOUS at 06:15

## 2019-08-28 RX ADMIN — HEPARIN SODIUM 5000 UNIT(S): 5000 INJECTION INTRAVENOUS; SUBCUTANEOUS at 18:42

## 2019-08-28 RX ADMIN — SODIUM CHLORIDE 1 GRAM(S): 9 INJECTION INTRAMUSCULAR; INTRAVENOUS; SUBCUTANEOUS at 18:42

## 2019-08-28 RX ADMIN — POLYETHYLENE GLYCOL 3350 17 GRAM(S): 17 POWDER, FOR SOLUTION ORAL at 12:48

## 2019-08-28 RX ADMIN — CARVEDILOL PHOSPHATE 12.5 MILLIGRAM(S): 80 CAPSULE, EXTENDED RELEASE ORAL at 06:15

## 2019-08-28 RX ADMIN — Medication 81 MILLIGRAM(S): at 12:48

## 2019-08-28 RX ADMIN — CARVEDILOL PHOSPHATE 6.25 MILLIGRAM(S): 80 CAPSULE, EXTENDED RELEASE ORAL at 18:42

## 2019-08-28 RX ADMIN — LISINOPRIL 10 MILLIGRAM(S): 2.5 TABLET ORAL at 06:15

## 2019-08-28 RX ADMIN — Medication 0.25 MILLIGRAM(S): at 22:34

## 2019-08-28 RX ADMIN — SODIUM CHLORIDE 1 GRAM(S): 9 INJECTION INTRAMUSCULAR; INTRAVENOUS; SUBCUTANEOUS at 06:15

## 2019-08-28 RX ADMIN — PANTOPRAZOLE SODIUM 40 MILLIGRAM(S): 20 TABLET, DELAYED RELEASE ORAL at 06:15

## 2019-08-28 RX ADMIN — AMLODIPINE BESYLATE 5 MILLIGRAM(S): 2.5 TABLET ORAL at 06:15

## 2019-08-28 NOTE — PROGRESS NOTE ADULT - ASSESSMENT
84 f with    Confusion/ Mental status change improving   - Neurology follow Dr. Torres     HTN control  - Nephrology follow  - cardiology follow    Hyponatremia improving   - fluid restrict  - nephrology follow  - hold diuretics    PEACE  - Pulmonary follow  - ID evaluation Dr. Casillas noted  - hold antibioRx     Dilated esophagus  - esophagogram no obstruction   - Gastroenterology follow    Dysphagia by history  - SS evaluation pending      d/w patient and     John Rogers MD pager [phone]

## 2019-08-28 NOTE — PROGRESS NOTE ADULT - SUBJECTIVE AND OBJECTIVE BOX
Patient is a 84y old  Female who presented with a chief complaint of confusion (28 Aug 2019 10:30)      INTERVAL HPI/OVERNIGHT EVENTS:  tolerating PO diet, no nausea, vomiting, or regurgitation of food  +BM this morning    MEDICATIONS  (STANDING):  amLODIPine   Tablet 5 milliGRAM(s) Oral daily  aspirin enteric coated 81 milliGRAM(s) Oral daily  carvedilol 6.25 milliGRAM(s) Oral every 12 hours  heparin  Injectable 5000 Unit(s) SubCutaneous every 12 hours  lisinopril 20 milliGRAM(s) Oral daily  pantoprazole    Tablet 40 milliGRAM(s) Oral before breakfast  polyethylene glycol 3350 17 Gram(s) Oral daily  sodium chloride 1 Gram(s) Oral two times a day    MEDICATIONS  (PRN):  acetaminophen   Tablet .. 650 milliGRAM(s) Oral every 6 hours PRN Temp greater or equal to 38.5C (101.3F), Mild Pain (1 - 3)      Allergies  No Known Allergies      Review of Systems:  General:  No fevers, chills, night sweats, fatigue, +weight loss ~15 pounds  CV:  No pain, palpitations, +HTn, see HPI  Resp:  No dyspnea, tachypnea, wheezing +cough +PEACE  GI:  see HPI  :  No pain, bleeding, incontinence, nocturia  Muscle:  No pain, weakness  Neuro:  see HPI  Psych:  No fatigue, insomnia, mood problems, depression  Endocrine:  No polyuria, polydypsia, cold/heat intolerance  Heme:  No petechiae, ecchymosis, easy bruisability  Skin:  No rash, tattoos, scars, edema    Vital Signs Last 24 Hrs  T(C): 36.8 (28 Aug 2019 12:45), Max: 36.8 (28 Aug 2019 12:45)  T(F): 98.3 (28 Aug 2019 12:45), Max: 98.3 (28 Aug 2019 12:45)  HR: 82 (28 Aug 2019 12:45) (67 - 82)  BP: 127/64 (28 Aug 2019 12:45) (80/48 - 150/60)  BP(mean): --  RR: 18 (28 Aug 2019 12:45) (17 - 18)  SpO2: 98% (28 Aug 2019 12:45) (96% - 99%)    PHYSICAL EXAM:  Constitutional: NAD, thin frail appearing WF OOB to chair  +spitting up saliva- clear, occ cough no acute distress +temporal wasting. no "wet" vocal quality. eating lunch. spouse at bedside  Neck: No LAD, supple no JVD  Respiratory: decreased BS bases L>R  Cardiovascular: S1 and S2, RRR  Gastrointestinal: BS+, soft, NT/ND, neg HSM,  Extremities: No peripheral edema, neg clubbing, cyanosis  Vascular: 2+ peripheral pulses  Neurological: alert and responsive. occasionally forgetful of details, no focal asymmetry  Psychiatric: Normal mood, normal affect  Skin: No rashes    LABS:                        11.5   6.79  )-----------( 261      ( 28 Aug 2019 09:15 )             35.9     08-28    133<L>  |  97  |  20  ----------------------------<  96  4.4   |  26  |  0.93    Ca    9.1      28 Aug 2019 06:50        RADIOLOGY & ADDITIONAL TESTS:  < from: Xray Esophagram (08.28.19 @ 10:20) >  FINDINGS:  Preliminary  radiograph of the partially visualized chest is   unremarkable.    The patient swallowed barium without difficulty.     Esophagus is normal in appearance. No mucosal lesions are seen. The   esophagus is not dilated. There is no abnormal extrinsic mass effect.   Some tertiary contractions were present. 13 mm barium tablet passes   freely into the stomach. There is no hiatal hernia is no reflux.     IMPRESSION:     Unremarkable esophagram.

## 2019-08-28 NOTE — PROGRESS NOTE ADULT - ASSESSMENT
84 year old with PMHx of hypertension and mycobacterium avium-intracellulare who presented with slurred speech, altered mental status, dizziness and generalized weakness for 1 day found to have hypertensive urgency, hyponatremia and LLL consolidation. Nephrology consulted for evaluation of hyponatremia. 84 year old with PMHx of hypertension and mycobacterium avium-intracellulare who presented with slurred speech, altered mental status, dizziness and generalized weakness for 1 day found to have hypertensive urgency, hyponatremia and LLL consolidation. Nephrology consulted for evaluation of hyponatremia.    Patient seen and examined by MS4 and attending.

## 2019-08-28 NOTE — PROGRESS NOTE ADULT - ASSESSMENT
HTN related to rifampin? - improved    PEACE - abx tx on hold;  repeat chest CT pending    ? swallowing issue - for swallowing study this AM    Neuro sx have resolved.

## 2019-08-28 NOTE — PROGRESS NOTE ADULT - SUBJECTIVE AND OBJECTIVE BOX
Cardiovascular Disease Progress Note    Overnight events: No acute events overnight.  awaiting speech and swallow eval. no cp/sob/palps/dizziness  Otherwise review of systems negative    Objective Findings:  T(C): 36.6 (19 @ 05:16), Max: 36.7 (19 @ 12:00)  HR: 70 (19 @ 05:16) (62 - 78)  BP: 131/74 (19 @ 05:16) (80/48 - 150/60)  RR: 17 (19 @ 05:16) (17 - 18)  SpO2: 98% (19 @ 05:16) (96% - 99%)  Wt(kg): --  Daily     Daily Weight in k.4 (28 Aug 2019 05:16)      Physical Exam:  Gen: NAD, frail  HEENT: EOMI  CV: RRR, normal S1 + S2, no m/r/g  Lungs: CTAB  Abd: soft, non-tender  Ext: No edema      Laboratory Data:                        11.5   6.00  )-----------( 260      ( 27 Aug 2019 08:47 )             35.0         133<L>  |  97  |  20  ----------------------------<  96  4.4   |  26  |  0.93    Ca    9.1      28 Aug 2019 06:50    TPro  8.1  /  Alb  4.4  /  TBili  0.8  /  DBili  x   /  AST  15  /  ALT  12  /  AlkPhos  57      PT/INR - ( 26 Aug 2019 14:42 )   PT: 13.4 sec;   INR: 1.17 ratio         PTT - ( 26 Aug 2019 14:42 )  PTT:28.4 sec          Inpatient Medications:  MEDICATIONS  (STANDING):  amLODIPine   Tablet 5 milliGRAM(s) Oral daily  aspirin enteric coated 81 milliGRAM(s) Oral daily  carvedilol 12.5 milliGRAM(s) Oral every 12 hours  heparin  Injectable 5000 Unit(s) SubCutaneous every 12 hours  lisinopril 10 milliGRAM(s) Oral daily  pantoprazole    Tablet 40 milliGRAM(s) Oral before breakfast  polyethylene glycol 3350 17 Gram(s) Oral daily  sodium chloride 1 Gram(s) Oral two times a day      Assessment:  -hypertensive urgency  -confusion  -hyponatremia  -PEACE currently on triple therapyy  -bronchiectasis  -dilated esophagus  -? asp pna    Recs:  -management of PEACE meds per pulm. ? role for bronch  -f/u GI recs. esophagram pending  -hyponatremia improving. likely hypovolemic  -c/w current anti-hypertensives. goal SBP < 150 (amlodipine 5, coreg decreased to 6.25mg bid, lisinopril 20)  -can likely d/c asa if not a home med  -f/u ID. recommendations appreciated  -dvt ppx      Over 25 minutes spent on total encounter; more than 50% of the visit was spent counseling and/or coordinating care by the attending physician.      Holland Maradiaga MD   Cardiovascular Disease  [phone]

## 2019-08-28 NOTE — PROGRESS NOTE ADULT - ASSESSMENT
84 year old with PMHx of hypertension and mycobacterium avium-intracellulare who presented with slurred speech, altered mental status, dizziness and generalized weakness for 1 day found to have hypertensive urgency and hyponatremia.    Weight loss and esophageal dilation +/- reported regurgitation (currently denies)  Dilated esophagus on CT Chest 6/2019 - reviewed imaging; esophagus appears dilated down to level where it gets compressed by heart/atrium.  Clinically does not appear to have oropharyngeal symptoms at this time  LLL consolidation reviewed and discussed with ID ?aspiration    Clinically appears stable, no leukocytosis or acute respiratory symptoms  Esophagram +presbyesophagus, no stricture or extrinsic compression. no esophageal dilation    RECS:  -PO diet and supplements as tolerated  -Renal follow up, monitor Na+  -defer Abx management to ID  -PO PPI daily  -Neuro work-up in progress  -No plans for further GI workup at this time  -Await SLP evaluation, no GI objection to administration of all necessary textures/consistencies for SLP evaluation    Shahbaz Miranda PA-C    [facility]  [phone]  After hours and weekend coverage [phone]

## 2019-08-28 NOTE — PROGRESS NOTE ADULT - SUBJECTIVE AND OBJECTIVE BOX
PULM/MED PROGRESS NOTE:  awake, alert comfortable.      MEDICATIONS  (STANDING):  amLODIPine   Tablet 5 milliGRAM(s) Oral daily  aspirin enteric coated 81 milliGRAM(s) Oral daily  carvedilol 6.25 milliGRAM(s) Oral every 12 hours  heparin  Injectable 5000 Unit(s) SubCutaneous every 12 hours  lisinopril 10 milliGRAM(s) Oral daily  pantoprazole    Tablet 40 milliGRAM(s) Oral before breakfast  polyethylene glycol 3350 17 Gram(s) Oral daily  sodium chloride 1 Gram(s) Oral two times a day      MEDICATIONS  (PRN):  acetaminophen   Tablet .. 650 milliGRAM(s) Oral every 6 hours PRN Temp greater or equal to 38.5C (101.3F), Mild Pain (1 - 3)          Vital Signs Last 24 Hrs  T(C): 36.6 (28 Aug 2019 08:00), Max: 36.7 (27 Aug 2019 12:00)  T(F): 97.8 (28 Aug 2019 08:00), Max: 98.1 (27 Aug 2019 12:00)  HR: 71 (28 Aug 2019 08:00) (62 - 78)  BP: 120/64 (28 Aug 2019 08:00) (80/48 - 150/60)  BP(mean): --  RR: 18 (28 Aug 2019 08:00) (17 - 18)  SpO2: 98% (28 Aug 2019 08:00) (96% - 99%)    PHYSICAL EXAMINATION:    GENERAL: The patient is awake and alert in no apparent distress.     HEENT: Head is normocephalic and atraumatic. Extraocular muscles are intact. Mucous membranes are moist.    NECK: Supple.    LUNGS: Clear to auscultation without wheezing, rales or rhonchi; respirations unlabored    HEART: Regular rate and rhythm without murmur.    ABDOMEN: Soft, nontender, and nondistended.      EXTREMITIES: Without any cyanosis, clubbing, rash, lesions or edema.    NEUROLOGIC: Grossly intact.    SKIN: No ulceration or induration present.      LABS:                        11.5   6.00  )-----------( 260      ( 27 Aug 2019 08:47 )             35.0     08-28    133<L>  |  97  |  20  ----------------------------<  96  4.4   |  26  |  0.93    Ca    9.1      28 Aug 2019 06:50    TPro  8.1  /  Alb  4.4  /  TBili  0.8  /  DBili  x   /  AST  15  /  ALT  12  /  AlkPhos  57  08-26    PT/INR - ( 26 Aug 2019 14:42 )   PT: 13.4 sec;   INR: 1.17 ratio         PTT - ( 26 Aug 2019 14:42 )  PTT:28.4 sec  Urinalysis Basic - ( 26 Aug 2019 18:53 )    Color: Light Yellow / Appearance: Clear / S.027 / pH: x  Gluc: x / Ketone: Small  / Bili: Negative / Urobili: Negative   Blood: x / Protein: Trace / Nitrite: Negative   Leuk Esterase: Negative / RBC: 1 /hpf / WBC 0 /HPF   Sq Epi: x / Non Sq Epi: 1 /hpf / Bacteria: Negative                      MICROBIOLOGY:      RADIOLOGY & ADDITIONAL STUDIES: PULM/MED PROGRESS NOTE:  awake, alert comfortable.  Oriented  No fever  Minimal cough      MEDICATIONS  (STANDING):  amLODIPine   Tablet 5 milliGRAM(s) Oral daily  aspirin enteric coated 81 milliGRAM(s) Oral daily  carvedilol 6.25 milliGRAM(s) Oral every 12 hours  heparin  Injectable 5000 Unit(s) SubCutaneous every 12 hours  lisinopril 10 milliGRAM(s) Oral daily  pantoprazole    Tablet 40 milliGRAM(s) Oral before breakfast  polyethylene glycol 3350 17 Gram(s) Oral daily  sodium chloride 1 Gram(s) Oral two times a day      MEDICATIONS  (PRN):  acetaminophen   Tablet .. 650 milliGRAM(s) Oral every 6 hours PRN Temp greater or equal to 38.5C (101.3F), Mild Pain (1 - 3)          Vital Signs Last 24 Hrs  T(C): 36.6 (28 Aug 2019 08:00), Max: 36.7 (27 Aug 2019 12:00)  T(F): 97.8 (28 Aug 2019 08:00), Max: 98.1 (27 Aug 2019 12:00)  HR: 71 (28 Aug 2019 08:00) (62 - 78)  BP: 120/64 (28 Aug 2019 08:00) (80/48 - 150/60)  BP(mean): --  RR: 18 (28 Aug 2019 08:00) (17 - 18)  SpO2: 98% (28 Aug 2019 08:00) (96% - 99%)    PHYSICAL EXAMINATION:    GENERAL: The patient is awake and alert in no apparent distress.     NECK: Supple.    LUNGS: Clear to auscultation without wheezing, rales or rhonchi; respirations unlabored    HEART: Regular rate and rhythm without murmur.    ABDOMEN: Soft, nontender    .NEUROLOGIC: Grossly intact.  Ox 3      LABS:                        11.5   6.00  )-----------( 260      ( 27 Aug 2019 08:47 )             35.0     08-    133<L>  |  97  |  20  ----------------------------<  96  4.4   |  26  |  0.93    Ca    9.1      28 Aug 2019 06:50    TPro  8.1  /  Alb  4.4  /  TBili  0.8  /  DBili  x   /  AST  15  /  ALT  12  /  AlkPhos  57  08-    PT/INR - ( 26 Aug 2019 14:42 )   PT: 13.4 sec;   INR: 1.17 ratio         PTT - ( 26 Aug 2019 14:42 )  PTT:28.4 sec  Urinalysis Basic - ( 26 Aug 2019 18:53 )    Color: Light Yellow / Appearance: Clear / S.027 / pH: x  Gluc: x / Ketone: Small  / Bili: Negative / Urobili: Negative   Blood: x / Protein: Trace / Nitrite: Negative   Leuk Esterase: Negative / RBC: 1 /hpf / WBC 0 /HPF   Sq Epi: x / Non Sq Epi: 1 /hpf / Bacteria: Negative      MICROBIOLOGY: (poor sample sputum)

## 2019-08-28 NOTE — PROGRESS NOTE ADULT - SUBJECTIVE AND OBJECTIVE BOX
Patient is a 84y old  Female who presents with a chief complaint of confusion (28 Aug 2019 16:39)      SUBJECTIVE / OVERNIGHT EVENTS: Comfortable without new complaints.  at bedside.  Review of Systems  chest pain no  palpitations no  sob no  nausea no  headache no    MEDICATIONS  (STANDING):  amLODIPine   Tablet 5 milliGRAM(s) Oral daily  aspirin enteric coated 81 milliGRAM(s) Oral daily  carvedilol 6.25 milliGRAM(s) Oral every 12 hours  heparin  Injectable 5000 Unit(s) SubCutaneous every 12 hours  lisinopril 20 milliGRAM(s) Oral daily  pantoprazole    Tablet 40 milliGRAM(s) Oral before breakfast  polyethylene glycol 3350 17 Gram(s) Oral daily  sodium chloride 1 Gram(s) Oral two times a day    MEDICATIONS  (PRN):  acetaminophen   Tablet .. 650 milliGRAM(s) Oral every 6 hours PRN Temp greater or equal to 38.5C (101.3F), Mild Pain (1 - 3)      Vital Signs Last 24 Hrs  T(C): 36.7 (28 Aug 2019 16:26), Max: 36.8 (28 Aug 2019 12:45)  T(F): 98 (28 Aug 2019 16:26), Max: 98.3 (28 Aug 2019 12:45)  HR: 75 (28 Aug 2019 16:26) (67 - 82)  BP: 115/82 (28 Aug 2019 18:38) (110/48 - 150/60)  BP(mean): --  RR: 18 (28 Aug 2019 16:26) (17 - 18)  SpO2: 95% (28 Aug 2019 16:26) (95% - 99%)    PHYSICAL EXAM:  GENERAL: NAD, well-developed  HEAD:  Atraumatic, Normocephalic  EYES: EOMI, PERRLA, conjunctiva and sclera clear  NECK: Supple, No JVD  CHEST/LUNG: Clear to auscultation bilaterally; No wheeze  HEART: Regular rate and rhythm; No murmurs, rubs, or gallops  ABDOMEN: Soft, Nontender, Nondistended; Bowel sounds present  EXTREMITIES:  2+ Peripheral Pulses, No clubbing, cyanosis, or edema  PSYCH: AAOx3  NEUROLOGY: non-focal  SKIN: No rashes or lesions    LABS:                        11.5   6.79  )-----------( 261      ( 28 Aug 2019 09:15 )             35.9     08-28    133<L>  |  97  |  20  ----------------------------<  96  4.4   |  26  |  0.93    Ca    9.1      28 Aug 2019 06:50                Culture - Sputum (collected 27 Aug 2019 16:44)  Source: .Sputum  Gram Stain (27 Aug 2019 19:17):    Few Squamous epithelial cells per low power field    Few polymorphonuclear leukocytes per low power field    Few Gram variable coccobacilli     per oil power field  Preliminary Report (28 Aug 2019 17:24):    Normal Respiratory Iram present        RADIOLOGY & ADDITIONAL TESTS:    Imaging Personally Reviewed:  < from: Xray Esophagram (08.28.19 @ 10:20) >    IMPRESSION:     Unremarkable esophagram.    < end of copied text >  < from: CT Chest No Cont (08.27.19 @ 22:14) >    IMPRESSION:     In comparison with 6/7/2019, interval decrease of mucoid impaction within   the segmental branches of basilar left lower lobe bronchus and slight   improvement of aeration within this area. The consolidation within left   lower lobe is now similar to that of 7/11/2018. Otherwise, bilateral   areas of bronchiectasis/mucoid impactions are unchanged. No new   consolidation.        < end of copied text >  < from: MR Head No Cont (08.27.19 @ 16:07) >  Impression:    No acute hemorrhage or infarct.    < end of copied text >    Consultant(s) Notes Reviewed:      Care Discussed with Consultants/Other Providers:

## 2019-08-28 NOTE — PROGRESS NOTE ADULT - ASSESSMENT
83yo F hx of PEACE, HTN presents with slurred speech / weakness starting at 9am when she woke up. Last known normal was 12pm last night. Code stroke was called. Generalized weakness, patient was confused as well. Recent started Rx for PEACE with Rifampin, Azithromycin and Ethambutol .  found to have elevated BP and hyponatremia. neuro consulted and ruled out for acute CVA.     Repeat CT noted - In comparison with 6/7/2019, interval decrease of mucoid impaction within the segmental branches of basilar left lower lobe bronchus and slight improvement of aeration within this area. The consolidation within left lower lobe is now similar to that of 7/11/2018. Otherwise, bilateral areas of bronchiectasis/mucoid impactions are unchanged. No new consolidation.    Await swallow study    WIll continue to hold NTM treatment for now  The questions remains  1- is patient aspirating? swallow study today  2- what is in the LLL - is that PEACE- would a bronchoscopy be helpful?  3- When we restart treatment , what should be our third agent?-  inhaled liposomal amikacin? , clofazamine, or rifabutin  Of note, pt will be away part of winter- so we need ot keep this in consideration with our treatment plan  4_ why was patient hyponatremic? check AM cortisol    work up in progress

## 2019-08-28 NOTE — PROGRESS NOTE ADULT - SUBJECTIVE AND OBJECTIVE BOX
Patient is a 84y old  Female who presents with a chief complaint of confusion (28 Aug 2019 15:41)    Being followed by ID for        Interval history:  pt feeling improved neurologically  respiratory sxs stable  No other acute events      PAST MEDICAL & SURGICAL HISTORY:  PEACE (mycobacterium avium-intracellulare)  HTN (hypertension)    Allergies    No Known Allergies    Intolerances      Antimicrobials:      MEDICATIONS  (STANDING):  amLODIPine   Tablet 5 milliGRAM(s) Oral daily  aspirin enteric coated 81 milliGRAM(s) Oral daily  carvedilol 6.25 milliGRAM(s) Oral every 12 hours  heparin  Injectable 5000 Unit(s) SubCutaneous every 12 hours  lisinopril 20 milliGRAM(s) Oral daily  pantoprazole    Tablet 40 milliGRAM(s) Oral before breakfast  polyethylene glycol 3350 17 Gram(s) Oral daily  sodium chloride 1 Gram(s) Oral two times a day      Vital Signs Last 24 Hrs  T(C): 36.7 (19 @ 16:26), Max: 36.8 (19 @ 12:45)  T(F): 98 (19 @ 16:26), Max: 98.3 (19 @ 12:45)  HR: 75 (19 @ 16:26) (67 - 82)  BP: 137/86 (19 @ 16:26) (80/48 - 150/60)  BP(mean): --  RR: 18 (19 @ 16:26) (17 - 18)  SpO2: 95% (19 @ 16:26) (95% - 99%)    Physical Exam:    Constitutional well preserved,comfortable,pleasant    HEENT PERRLA EOMI,No pallor or icterus    No oral exudate or erythema    Neck supple no JVD or LN    Chest Good AE,CTA    CVS RRR S1 S2 WNl     Abd soft BS normal No tenderness     Ext No cyanosis clubbing or edema    IV site no erythema tenderness or discharge    Joints no swelling or LOM    CNS AAO X 3 no focal    Lab Data:                          11.5   6.79  )-----------( 261      ( 28 Aug 2019 09:15 )             35.9       08-    133<L>  |  97  |  20  ----------------------------<  96  4.4   |  26  |  0.93    Ca    9.1      28 Aug 2019 06:50      Urinalysis Basic - ( 26 Aug 2019 18:53 )    Color: Light Yellow / Appearance: Clear / S.027 / pH: x  Gluc: x / Ketone: Small  / Bili: Negative / Urobili: Negative   Blood: x / Protein: Trace / Nitrite: Negative   Leuk Esterase: Negative / RBC: 1 /hpf / WBC 0 /HPF   Sq Epi: x / Non Sq Epi: 1 /hpf / Bacteria: Negative      .Sputum  19 --  --    Few Squamous epithelial cells per low power field  Few polymorphonuclear leukocytes per low power field  Few Gram variable coccobacilli   per oil power field        WBC Count: 6.79 (19 @ 09:15)  WBC Count: 6.00 (19 @ 08:47)  WBC Count: 12.5 (19 @ 14:42)      < from: Xray Esophagram (19 @ 10:20) >  IMPRESSION:     Unremarkable esophagram.    < end of copied text >    < from: CT Chest No Cont (19 @ 22:14) >    EXAM:  CT CHEST                            PROCEDURE DATE:  2019            INTERPRETATION:  CLINICAL INFORMATION: Cough, fever, PEACE    COMPARISON: CT chest 2019-2018    PROCEDURE:   CT of the Chest was performed without intravenous contrast.  Sagittal and coronal reformats were performed.      FINDINGS:    LUNGS AND AIRWAYS: Patent central airways.  In comparison with 2019,   interval decrease of mucoid impaction within the segmental branches of   basilar left lower lobe bronchus and slight improvement of aeration   within this area. The consolidation within left lower lobe is now similar   to that of 2018. Otherwise bilateral areas of bronchiectasis/mucoid   impactions are unchanged. No new consolidation.    PLEURA: No pleural effusion.    MEDIASTINUM AND HAILEY: The esophagus is dilated. No lymphadenopathy.    VESSELS: Mild calcific coronary atherosclerosis. Mild aortic valve   leaflet calcification. Thoracic aorta and pulmonary artery normal in   diameter.    HEART: Heart size is normal. Trace pericardial effusion.    CHEST WALL AND LOWER NECK: Within normal limits.    VISUALIZED UPPER ABDOMEN: Nodular thickening of the left adrenal gland is   again noted.    BONES: Within normal limits.    IMPRESSION:     In comparison with 2019, interval decrease of mucoid impaction within   the segmental branches of basilar left lower lobe bronchus and slight   improvement of aeration within this area. The consolidation within left   lower lobe is now similar to that of 2018. Otherwise, bilateral   areas of bronchiectasis/mucoid impactions are unchanged. No new   consolidation.        CARLOTTA CARBALLO M.D., ATTENDING RADIOLOGIST  This document has been electronically signed. Aug 28 2019 10:36AM        < end of copied text >      < from: MR Head No Cont (08.27.19 @ 16:07) >    Impression:    No acute hemorrhage or infarct.    < end of copied text >

## 2019-08-28 NOTE — PROGRESS NOTE ADULT - SUBJECTIVE AND OBJECTIVE BOX
[facility] DIVISION OF KIDNEY DISEASES AND HYPERTENSION -- FOLLOW UP NOTE  --------------------------------------------------------------------------------    24 hour events/subjective: Overnight, one episode of hypotension with systolic BP in 80s. Patient was asymptomatic. Patient seen and examined at the bedside this morning. She denies dizziness or lightheadedness. She is tolerating her diet with no nausea or vomiting. She denies any diarrhea or constipation. She continues to endorse intermittent productive cough and pleuritic chest pain.        PAST HISTORY  --------------------------------------------------------------------------------  No significant changes to PMH, PSH, FHx, SHx, unless otherwise noted    ALLERGIES & MEDICATIONS  --------------------------------------------------------------------------------  Allergies    No Known Allergies    Intolerances      Standing Inpatient Medications  amLODIPine   Tablet 5 milliGRAM(s) Oral daily  aspirin enteric coated 81 milliGRAM(s) Oral daily  carvedilol 6.25 milliGRAM(s) Oral every 12 hours  heparin  Injectable 5000 Unit(s) SubCutaneous every 12 hours  lisinopril 20 milliGRAM(s) Oral daily  pantoprazole    Tablet 40 milliGRAM(s) Oral before breakfast  polyethylene glycol 3350 17 Gram(s) Oral daily  sodium chloride 1 Gram(s) Oral two times a day    PRN Inpatient Medications  acetaminophen   Tablet .. 650 milliGRAM(s) Oral every 6 hours PRN      REVIEW OF SYSTEMS  --------------------------------------------------------------------------------  Gen: No fevers/chills  Head/Eyes/Ears/Mouth: No headache; Normal hearing; Normal vision    Respiratory: No dyspnea, +cough  CV: No chest pain, +pleuritic chest pain  GI: No abdominal pain, diarrhea, constipation, nausea, vomiting  : No increased frequency, dysuria  MSK: No joint pain/swelling; no edema    All other systems were reviewed and are negative, except as noted.    >>> <<<    VITALS/PHYSICAL EXAM  --------------------------------------------------------------------------------  T(C): 36.6 (08-28-19 @ 08:00), Max: 36.7 (08-27-19 @ 12:00)  HR: 71 (08-28-19 @ 08:00) (62 - 78)  BP: 120/64 (08-28-19 @ 08:00) (80/48 - 150/60)  RR: 18 (08-28-19 @ 08:00) (17 - 18)  SpO2: 98% (08-28-19 @ 08:00) (96% - 99%)  Wt(kg): --  Height (cm): 165.1 (08-26-19 @ 14:29)  Weight (kg): 44 (08-26-19 @ 14:29)  BMI (kg/m2): 16.1 (08-26-19 @ 14:29)  BSA (m2): 1.45 (08-26-19 @ 14:29)      08-27-19 @ 07:01  -  08-28-19 @ 07:00  --------------------------------------------------------  IN: 1220 mL / OUT: 4 mL / NET: 1216 mL      Physical Exam:    	Gen: frail, cachectic appearing woman in no acute distress  	HEENT: +moist oral mucosa  	Pulm: decreased breath sounds over LLL  	CV: RRR, S1S2; no rub  	Abd: +BS, soft, nontender/nondistended, no masses appreciated       	: No suprapubic tenderness  	LE: Warm, no clubbing, intact strength; no edema  	Neuro: No focal deficits, AOX3, no asterixis      	Vascular Access: none      LABS/STUDIES  --------------------------------------------------------------------------------              11.5   6.79  >-----------<  261      [08-28-19 @ 09:15]              35.9     133  |  97  |  20  ----------------------------<  96      [08-28-19 @ 06:50]  4.4   |  26  |  0.93        Ca     9.1     [08-28-19 @ 06:50]    TPro  8.1  /  Alb  4.4  /  TBili  0.8  /  DBili  x   /  AST  15  /  ALT  12  /  AlkPhos  57  [08-26-19 @ 14:42]    PT/INR: PT 13.4 , INR 1.17       [08-26-19 @ 14:42]  PTT: 28.4       [08-26-19 @ 14:42]      Creatinine Trend:  SCr 0.93 [08-28 @ 06:50]  SCr 0.86 [08-27 @ 06:58]  SCr 0.99 [08-26 @ 14:42]    Urinalysis - [08-26-19 @ 18:53]      Color Light Yellow / Appearance Clear / SG 1.027 / pH 6.5      Gluc Negative / Ketone Small  / Bili Negative / Urobili Negative       Blood Negative / Protein Trace / Leuk Est Negative / Nitrite Negative      RBC 1 / WBC 0 / Hyaline 0 / Gran  / Sq Epi  / Non Sq Epi 1 / Bacteria Negative    Urine Creatinine 30      [08-26-19 @ 18:53]  Urine Sodium 27      [08-27-19 @ 20:37]  Urine Osmolality 583      [08-28-19 @ 01:12]    TSH 3.50      [08-27-19 @ 08:26] [facility] DIVISION OF KIDNEY DISEASES AND HYPERTENSION -- FOLLOW UP NOTE  --------------------------------------------------------------------------------    24 hour events/subjective: Overnight, one episode of hypotension with systolic BP in 80s. Given 250 cc bolus NS. Patient was asymptomatic. Patient seen and examined at the bedside this morning. She denies dizziness or lightheadedness. She is tolerating her diet with no nausea or vomiting. She denies any diarrhea or constipation. She continues to endorse intermittent productive cough and pleuritic chest pain.        PAST HISTORY  --------------------------------------------------------------------------------  No significant changes to PMH, PSH, FHx, SHx, unless otherwise noted    ALLERGIES & MEDICATIONS  --------------------------------------------------------------------------------  Allergies    No Known Allergies    Intolerances      Standing Inpatient Medications  amLODIPine   Tablet 5 milliGRAM(s) Oral daily  aspirin enteric coated 81 milliGRAM(s) Oral daily  carvedilol 6.25 milliGRAM(s) Oral every 12 hours  heparin  Injectable 5000 Unit(s) SubCutaneous every 12 hours  lisinopril 20 milliGRAM(s) Oral daily  pantoprazole    Tablet 40 milliGRAM(s) Oral before breakfast  polyethylene glycol 3350 17 Gram(s) Oral daily  sodium chloride 1 Gram(s) Oral two times a day    PRN Inpatient Medications  acetaminophen   Tablet .. 650 milliGRAM(s) Oral every 6 hours PRN      REVIEW OF SYSTEMS  --------------------------------------------------------------------------------  Gen: No fevers/chills  Head/Eyes/Ears/Mouth: No headache; Normal hearing; Normal vision    Respiratory: No dyspnea, +cough  CV: No chest pain, +pleuritic chest pain  GI: No abdominal pain, diarrhea, constipation, nausea, vomiting  : No increased frequency, dysuria  MSK: No joint pain/swelling; no edema    All other systems were reviewed and are negative, except as noted.    >>> <<<    VITALS/PHYSICAL EXAM  --------------------------------------------------------------------------------  T(C): 36.6 (08-28-19 @ 08:00), Max: 36.7 (08-27-19 @ 12:00)  HR: 71 (08-28-19 @ 08:00) (62 - 78)  BP: 120/64 (08-28-19 @ 08:00) (80/48 - 150/60)  RR: 18 (08-28-19 @ 08:00) (17 - 18)  SpO2: 98% (08-28-19 @ 08:00) (96% - 99%)  Wt(kg): --  Height (cm): 165.1 (08-26-19 @ 14:29)  Weight (kg): 44 (08-26-19 @ 14:29)  BMI (kg/m2): 16.1 (08-26-19 @ 14:29)  BSA (m2): 1.45 (08-26-19 @ 14:29)      08-27-19 @ 07:01  -  08-28-19 @ 07:00  --------------------------------------------------------  IN: 1220 mL / OUT: 4 mL / NET: 1216 mL      Physical Exam:    	Gen: frail, cachectic appearing woman in no acute distress  	HEENT: +moist oral mucosa  	Pulm: decreased breath sounds over LLL  	CV: RRR, S1S2; no rub  	Abd: +BS, soft, nontender/nondistended, no masses appreciated       	: No suprapubic tenderness  	LE: Warm, no clubbing, intact strength; no edema  	Neuro: No focal deficits, AOX3, no asterixis      	Vascular Access: none      LABS/STUDIES  --------------------------------------------------------------------------------              11.5   6.79  >-----------<  261      [08-28-19 @ 09:15]              35.9     133  |  97  |  20  ----------------------------<  96      [08-28-19 @ 06:50]  4.4   |  26  |  0.93        Ca     9.1     [08-28-19 @ 06:50]    TPro  8.1  /  Alb  4.4  /  TBili  0.8  /  DBili  x   /  AST  15  /  ALT  12  /  AlkPhos  57  [08-26-19 @ 14:42]    PT/INR: PT 13.4 , INR 1.17       [08-26-19 @ 14:42]  PTT: 28.4       [08-26-19 @ 14:42]      Creatinine Trend:  SCr 0.93 [08-28 @ 06:50]  SCr 0.86 [08-27 @ 06:58]  SCr 0.99 [08-26 @ 14:42]    Urinalysis - [08-26-19 @ 18:53]      Color Light Yellow / Appearance Clear / SG 1.027 / pH 6.5      Gluc Negative / Ketone Small  / Bili Negative / Urobili Negative       Blood Negative / Protein Trace / Leuk Est Negative / Nitrite Negative      RBC 1 / WBC 0 / Hyaline 0 / Gran  / Sq Epi  / Non Sq Epi 1 / Bacteria Negative    Urine Creatinine 30      [08-26-19 @ 18:53]  Urine Sodium 27      [08-27-19 @ 20:37]  Urine Osmolality 583      [08-28-19 @ 01:12]    TSH 3.50      [08-27-19 @ 08:26]

## 2019-08-29 ENCOUNTER — TRANSCRIPTION ENCOUNTER (OUTPATIENT)
Age: 84
End: 2019-08-29

## 2019-08-29 VITALS
OXYGEN SATURATION: 92 % | HEART RATE: 92 BPM | TEMPERATURE: 98 F | RESPIRATION RATE: 18 BRPM | SYSTOLIC BLOOD PRESSURE: 147 MMHG | DIASTOLIC BLOOD PRESSURE: 69 MMHG

## 2019-08-29 DIAGNOSIS — A31.0 PULMONARY MYCOBACTERIAL INFECTION: ICD-10-CM

## 2019-08-29 DIAGNOSIS — R47.81 SLURRED SPEECH: ICD-10-CM

## 2019-08-29 LAB
CORTIS AM PEAK SERPL-MCNC: 25.7 UG/DL — HIGH (ref 6–18.4)
CULTURE RESULTS: SIGNIFICANT CHANGE UP
ENA SCL70 AB SER-ACNC: <0.2 AI — SIGNIFICANT CHANGE UP
SPECIMEN SOURCE: SIGNIFICANT CHANGE UP

## 2019-08-29 PROCEDURE — 99233 SBSQ HOSP IP/OBS HIGH 50: CPT

## 2019-08-29 RX ORDER — LISINOPRIL 2.5 MG/1
1 TABLET ORAL
Qty: 0 | Refills: 0 | DISCHARGE

## 2019-08-29 RX ORDER — ALPRAZOLAM 0.25 MG
1 TABLET ORAL
Qty: 0 | Refills: 0 | DISCHARGE

## 2019-08-29 RX ORDER — CARVEDILOL PHOSPHATE 80 MG/1
1 CAPSULE, EXTENDED RELEASE ORAL
Qty: 0 | Refills: 0 | DISCHARGE
Start: 2019-08-29

## 2019-08-29 RX ORDER — LISINOPRIL 2.5 MG/1
1 TABLET ORAL
Qty: 0 | Refills: 0 | DISCHARGE
Start: 2019-08-29

## 2019-08-29 RX ORDER — CARVEDILOL PHOSPHATE 80 MG/1
1 CAPSULE, EXTENDED RELEASE ORAL
Qty: 60 | Refills: 0
Start: 2019-08-29 | End: 2019-09-27

## 2019-08-29 RX ORDER — AZITHROMYCIN 500 MG/1
1 TABLET, FILM COATED ORAL
Qty: 0 | Refills: 0 | DISCHARGE

## 2019-08-29 RX ORDER — PANTOPRAZOLE SODIUM 20 MG/1
1 TABLET, DELAYED RELEASE ORAL
Qty: 0 | Refills: 0 | DISCHARGE
Start: 2019-08-29

## 2019-08-29 RX ORDER — POLYETHYLENE GLYCOL 3350 17 G/17G
17 POWDER, FOR SOLUTION ORAL
Qty: 0 | Refills: 0 | DISCHARGE
Start: 2019-08-29

## 2019-08-29 RX ORDER — PANTOPRAZOLE SODIUM 20 MG/1
1 TABLET, DELAYED RELEASE ORAL
Qty: 30 | Refills: 0
Start: 2019-08-29 | End: 2019-09-27

## 2019-08-29 RX ORDER — LISINOPRIL 2.5 MG/1
1 TABLET ORAL
Qty: 30 | Refills: 0
Start: 2019-08-29 | End: 2019-09-27

## 2019-08-29 RX ORDER — ETHAMBUTOL HYDROCHLORIDE 400 MG/1
6 TABLET, FILM COATED ORAL
Qty: 0 | Refills: 0 | DISCHARGE

## 2019-08-29 RX ORDER — TRIAMTERENE/HYDROCHLOROTHIAZID 75 MG-50MG
1 TABLET ORAL
Qty: 0 | Refills: 0 | DISCHARGE

## 2019-08-29 RX ORDER — NITROGLYCERIN 6.5 MG
1 CAPSULE, EXTENDED RELEASE ORAL
Qty: 0 | Refills: 0 | DISCHARGE

## 2019-08-29 RX ORDER — CARVEDILOL PHOSPHATE 80 MG/1
1 CAPSULE, EXTENDED RELEASE ORAL
Qty: 0 | Refills: 0 | DISCHARGE

## 2019-08-29 RX ADMIN — Medication 81 MILLIGRAM(S): at 11:38

## 2019-08-29 RX ADMIN — PANTOPRAZOLE SODIUM 40 MILLIGRAM(S): 20 TABLET, DELAYED RELEASE ORAL at 06:17

## 2019-08-29 RX ADMIN — SODIUM CHLORIDE 1 GRAM(S): 9 INJECTION INTRAMUSCULAR; INTRAVENOUS; SUBCUTANEOUS at 06:17

## 2019-08-29 RX ADMIN — LISINOPRIL 20 MILLIGRAM(S): 2.5 TABLET ORAL at 06:17

## 2019-08-29 RX ADMIN — CARVEDILOL PHOSPHATE 6.25 MILLIGRAM(S): 80 CAPSULE, EXTENDED RELEASE ORAL at 06:17

## 2019-08-29 RX ADMIN — POLYETHYLENE GLYCOL 3350 17 GRAM(S): 17 POWDER, FOR SOLUTION ORAL at 11:38

## 2019-08-29 RX ADMIN — AMLODIPINE BESYLATE 5 MILLIGRAM(S): 2.5 TABLET ORAL at 06:17

## 2019-08-29 RX ADMIN — HEPARIN SODIUM 5000 UNIT(S): 5000 INJECTION INTRAVENOUS; SUBCUTANEOUS at 06:17

## 2019-08-29 NOTE — DISCHARGE NOTE PROVIDER - PROVIDER TOKENS
PROVIDER:[TOKEN:[[id number]]],PROVIDER:[TOKEN:[[id number]]],PROVIDER:[TOKEN:[[id number]]],PROVIDER:[TOKEN:[[id number]]]

## 2019-08-29 NOTE — DISCHARGE NOTE PROVIDER - NSDCCPCAREPLAN_GEN_ALL_CORE_FT
PRINCIPAL DISCHARGE DIAGNOSIS  Diagnosis: Slurred speech  Assessment and Plan of Treatment: Resolved  Follow up with Neurologist      SECONDARY DISCHARGE DIAGNOSES  Diagnosis: PEACE (mycobacterium avium-intracellulare)  Assessment and Plan of Treatment: Medications on hold  Follow up with Pulmnologist   Follow up with Infectious disease    Diagnosis: Hyponatremia  Assessment and Plan of Treatment: Resolved  Follow up with PMD for repeat blood work to monitor    Diagnosis: Hypertension, unspecified type  Assessment and Plan of Treatment: Take medications for your blood pressure as recommended.  Eat a heart healthy diet that is low in saturated fats and salt, and includes whole grains, fruits, vegetables and lean protein   Exercise regularly (consult with your physician or cardiologist first); maintain a heart healthy weight.   If you smoke - quit (A resource to help you stop smoking is the [facility] – phone number [phone].). Continue to follow with your primary physician or cardiologist.   Seek medical help for dizziness, Lightheadedness, Blurry vision, Headache, Chest pain, Shortness of breath  Follow up with your medical doctor to establish long term blood pressure treatment goals.

## 2019-08-29 NOTE — DISCHARGE NOTE PROVIDER - NSDCFUADDAPPT_GEN_ALL_CORE_FT
Follow with Pulmonary Dr. Moody in 4 days, Infectious Disease Dr. Casillas in 4-5 days, Cardiology Dr. Maradiaga in 4-5 days.

## 2019-08-29 NOTE — PROGRESS NOTE ADULT - PROBLEM SELECTOR PLAN 2
Currently off abx.  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  I discussed the case yesterday and today with ID, Dr. Casillas - wants to wait on restarting treatment - and also needs to arrive at which third agent would be added too the regimen.  I reviewed the chest CTs with Dr. OSWALD hill today    The patient will followup early next week with me and ID
Patient presented with hypertensive urgency in setting of changing anti-hypertensive regimen due to interactions with PEACE medications.  - Manage as per cardiology recs and per primary team.

## 2019-08-29 NOTE — PROGRESS NOTE ADULT - SUBJECTIVE AND OBJECTIVE BOX
Patient is a 84y old  Female who presents with a chief complaint of confusion (29 Aug 2019 07:31)      SUBJECTIVE / OVERNIGHT EVENTS: No new complaints. Wants to go home. Refusing SS evaluation.  Review of Systems  chest pain no  palpitations no  sob no  nausea no  headache no    MEDICATIONS  (STANDING):  amLODIPine   Tablet 5 milliGRAM(s) Oral daily  aspirin enteric coated 81 milliGRAM(s) Oral daily  carvedilol 6.25 milliGRAM(s) Oral every 12 hours  heparin  Injectable 5000 Unit(s) SubCutaneous every 12 hours  lisinopril 20 milliGRAM(s) Oral daily  pantoprazole    Tablet 40 milliGRAM(s) Oral before breakfast  polyethylene glycol 3350 17 Gram(s) Oral daily  sodium chloride 1 Gram(s) Oral two times a day    MEDICATIONS  (PRN):  acetaminophen   Tablet .. 650 milliGRAM(s) Oral every 6 hours PRN Temp greater or equal to 38.5C (101.3F), Mild Pain (1 - 3)      Vital Signs Last 24 Hrs  T(C): 36.6 (29 Aug 2019 11:31), Max: 36.8 (28 Aug 2019 12:45)  T(F): 97.9 (29 Aug 2019 11:31), Max: 98.3 (28 Aug 2019 12:45)  HR: 76 (29 Aug 2019 11:31) (62 - 86)  BP: 134/89 (29 Aug 2019 11:31) (115/82 - 146/64)  BP(mean): --  RR: 18 (29 Aug 2019 11:31) (18 - 18)  SpO2: 98% (29 Aug 2019 11:31) (95% - 100%)    PHYSICAL EXAM:  GENERAL: NAD, well-developed  HEAD:  Atraumatic, Normocephalic  EYES: EOMI, PERRLA, conjunctiva and sclera clear  NECK: Supple, No JVD  CHEST/LUNG: Clear to auscultation bilaterally; No wheeze  HEART: Regular rate and rhythm; No murmurs, rubs, or gallops  ABDOMEN: Soft, Nontender, Nondistended; Bowel sounds present  EXTREMITIES:  2+ Peripheral Pulses, No clubbing, cyanosis, or edema  PSYCH: AAOx3  NEUROLOGY: non-focal  SKIN: No rashes or lesions    LABS:                        11.5   6.79  )-----------( 261      ( 28 Aug 2019 09:15 )             35.9     08-28    133<L>  |  97  |  20  ----------------------------<  96  4.4   |  26  |  0.93    Ca    9.1      28 Aug 2019 06:50                Culture - Sputum (collected 27 Aug 2019 16:44)  Source: .Sputum  Gram Stain (27 Aug 2019 19:17):    Few Squamous epithelial cells per low power field    Few polymorphonuclear leukocytes per low power field    Few Gram variable coccobacilli     per oil power field  Preliminary Report (28 Aug 2019 17:24):    Normal Respiratory Iram present        RADIOLOGY & ADDITIONAL TESTS:    Imaging Personally Reviewed:    Consultant(s) Notes Reviewed:      Care Discussed with Consultants/Other Providers:

## 2019-08-29 NOTE — PROGRESS NOTE ADULT - ASSESSMENT
84 f with    Confusion/ Mental status change resolved   - Neurology follow Dr. Torres     HTN control  - Nephrology follow  - cardiology follow    Hyponatremia resolved  - nephrology follow  - hold diuretics    PEACE  - Pulmonary follow  - ID evaluation Dr. Casillas noted  - hold antibioRx   - possible bronchoscopy as OTP    Dilated esophagus  - esophagogram no obstruction   - Gastroenterology follow    Dysphagia by history  - SS evaluation refused. May be done as OTP.       DC home . Follow with Pulmonary Dr. Moody in 4 days , Infectious Disease Dr. Casillas in 4-5 days, Cardiology Dr. Maradiaga in 4-5 days.    d/w patient  and Dr. Heidy Rogers MD pager [phone]

## 2019-08-29 NOTE — DISCHARGE NOTE PROVIDER - HOSPITAL COURSE
85yo F hx of PEACE, HTN presents with slurred speech / weakness starting at 9am when she woke up. Last known normal was 12pm last night. Code stroke was called. Generalized weakness, patient was confused as well. Recent started Rx for PEACE with Rifampin, Azithromycin and Ethambutol .  found to have elevated BP and hyponatremia. neuro consulted and ruled out for acute CVA.     Sodium improved with salt tabs. BP meds adjusted. PEACE treatment on hold. Dilated esophagus on CT. esophagogram no obstruction. outpt f/u with pulm, ID.

## 2019-08-29 NOTE — DISCHARGE NOTE PROVIDER - CARE PROVIDERS DIRECT ADDRESSES
[email or web address],DirectAddress_Unknown,DirectAddress_Unknown,DirectAddress_Unknown

## 2019-08-29 NOTE — PROGRESS NOTE ADULT - REASON FOR ADMISSION
confusion

## 2019-08-29 NOTE — PROGRESS NOTE ADULT - ATTENDING COMMENTS
Natali Casillas M.D. ,   Pager [phone]     after 5PM/ weekends [phone]
AGree with above, recommend oupatient GI f/u for possible EGD and esophageal motility study.
I have seen this patient with the fellow and agree with their assessment and plan. In addition,    #Hyponatremia - multifactorial (low solute intake, SIADH + thiazide use). 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 Nausea/vomiting is likely the stimulant of ADH. Encourage po intake and protein supplementation. Serum sodium improved to 133 today. Continue sodium tabs 1 g bid.     #Hypotension - Patient was hypotensive overnight. BP improved to 120/70 with 250ml fluid bolus. Agree with holding dyazide (triamterene/hctz).    # Nausea/vomiting - strong trigger for ADH release. To continue symptom control.      The rest of the recommendations as per medical student's note.    Nova Cheatham MD  Attending Nephrologist  [phone]

## 2019-08-29 NOTE — PROGRESS NOTE ADULT - SUBJECTIVE AND OBJECTIVE BOX
Cardiovascular Disease Progress Note    Overnight events: No acute events overnight.  s/p ct chest with improved findings. normal esophagram. no cp/sob/palps/dizziness  Otherwise review of systems negative    Objective Findings:  T(C): 36.7 (08-29-19 @ 05:21), Max: 36.8 (08-28-19 @ 12:45)  HR: 62 (08-29-19 @ 05:21) (62 - 86)  BP: 146/64 (08-29-19 @ 05:21) (115/82 - 146/64)  RR: 18 (08-29-19 @ 05:21) (18 - 18)  SpO2: 98% (08-29-19 @ 05:21) (95% - 100%)  Wt(kg): --  Daily     Daily       Physical Exam:  Gen: NAD  HEENT: EOMI  CV: RRR, normal S1 + S2, no m/r/g  Lungs: CTAB  Abd: soft, non-tender  Ext: No edema      Laboratory Data:                        11.5   6.79  )-----------( 261      ( 28 Aug 2019 09:15 )             35.9     08-28    133<L>  |  97  |  20  ----------------------------<  96  4.4   |  26  |  0.93    Ca    9.1      28 Aug 2019 06:50                Inpatient Medications:  MEDICATIONS  (STANDING):  amLODIPine   Tablet 5 milliGRAM(s) Oral daily  aspirin enteric coated 81 milliGRAM(s) Oral daily  carvedilol 6.25 milliGRAM(s) Oral every 12 hours  heparin  Injectable 5000 Unit(s) SubCutaneous every 12 hours  lisinopril 20 milliGRAM(s) Oral daily  pantoprazole    Tablet 40 milliGRAM(s) Oral before breakfast  polyethylene glycol 3350 17 Gram(s) Oral daily  sodium chloride 1 Gram(s) Oral two times a day      Assessment:  -hypertensive urgency  -confusion  -hyponatremia  -PEACE currently on triple therapyy  -bronchiectasis  -dilated esophagus  -? asp pna    Recs:  -management of PEACE meds per pulm/ID. appreciate input  -f/u GI recs. esophagram wnl  -hyponatremia improving. likely hypovolemic + component of siadh  -c/w current anti-hypertensives. goal SBP < 150 (amlodipine 5, coreg decreased to 6.25mg bid, lisinopril 20)  -can likely d/c asa if not a home med  -dvt ppx  -dispo planning      Over 25 minutes spent on total encounter; more than 50% of the visit was spent counseling and/or coordinating care by the attending physician.      Holland Maradiaga MD   Cardiovascular Disease  [phone]

## 2019-08-29 NOTE — SWALLOW BEDSIDE ASSESSMENT ADULT - SWALLOW EVAL: DIAGNOSIS
Chart review initiated, not CT chest results and ID note suggesting that CT findings may be related to aspiration PNA. Contacted medicine NP Mandi to request an order for a modified barium swallow study for comprehensive evaluation of swallow function. Per NP, "no need to see the patient as she wants to leave, does not want evaluation,.... and that pt had esophagram and no aspiration was noted". D/W NP that esophagram non-diagnostic for aspiration, but that this service would honor patient wishes to defer swallow evaluation. NP indicated that she would cancel order. Chart review initiated, not CT chest results and ID note suggesting that CT findings may be related to aspiration PNA. Contacted medicine NP Mandi to request an order for a modified barium swallow study for comprehensive evaluation of swallow function. Per NP, "no need to see the patient as she wants to leave, does not want evaluation,.... and that pt had esophagram and no aspiration was noted". D/W NP that esophagram non-diagnostic for aspiration, but that this service would honor patient wishes to defer swallow evaluation. NP indicated that she would cancel order. This was reviewed with GI PA. This service will sign off given patient wishes

## 2019-08-29 NOTE — PROGRESS NOTE ADULT - SUBJECTIVE AND OBJECTIVE BOX
Patient is a 84y old  Female who presented with a chief complaint of confusion (29 Aug 2019 11:51)      INTERVAL HPI/OVERNIGHT EVENTS:  tolerating PO   no dysphagia or odynophagia  appetite still fair but enjoying Ensure shakes with meals  no abdominal pain, nausea or vomiting  no diarrhea but +BMs    eager to go home  awaiting SLP evaluation    MEDICATIONS  (STANDING):  amLODIPine   Tablet 5 milliGRAM(s) Oral daily  aspirin enteric coated 81 milliGRAM(s) Oral daily  carvedilol 6.25 milliGRAM(s) Oral every 12 hours  heparin  Injectable 5000 Unit(s) SubCutaneous every 12 hours  lisinopril 20 milliGRAM(s) Oral daily  pantoprazole    Tablet 40 milliGRAM(s) Oral before breakfast  polyethylene glycol 3350 17 Gram(s) Oral daily  sodium chloride 1 Gram(s) Oral two times a day    MEDICATIONS  (PRN):  acetaminophen   Tablet .. 650 milliGRAM(s) Oral every 6 hours PRN Temp greater or equal to 38.5C (101.3F), Mild Pain (1 - 3)      Allergies  No Known Allergies      Review of Systems:  General:  No fevers, chills, night sweats, fatigue, +weight loss ~15 pounds  CV:  No pain, palpitations, +HTn, see HPI  Resp:  No dyspnea, tachypnea, wheezing +cough +PEACE  GI:  see HPI  :  No pain, bleeding, incontinence, nocturia  Muscle:  No pain, weakness  Neuro:  see HPI  Psych:  No fatigue, insomnia, mood problems, depression  Endocrine:  No polyuria, polydypsia, cold/heat intolerance  Heme:  No petechiae, ecchymosis, easy bruisability  Skin:  No rash, tattoos, scars, edema    Vital Signs Last 24 Hrs  T(C): 36.6 (29 Aug 2019 11:31), Max: 36.8 (28 Aug 2019 12:45)  T(F): 97.9 (29 Aug 2019 11:31), Max: 98.3 (28 Aug 2019 12:45)  HR: 76 (29 Aug 2019 11:31) (62 - 86)  BP: 134/89 (29 Aug 2019 11:31) (115/82 - 146/64)  BP(mean): --  RR: 18 (29 Aug 2019 11:31) (18 - 18)  SpO2: 98% (29 Aug 2019 11:31) (95% - 100%)    PHYSICAL EXAM:  Constitutional: NAD, thin frail appearing WF OOB to chair  no acute distress +temporal wasting. no "wet" vocal quality. completed breakfast (cereal with 1 banana, 1/2C coffee, 4oz whole milk and 1 bottle vanilla Ensure)  Neck: No LAD, supple no JVD  Respiratory: decreased BS bases L>R  Cardiovascular: S1 and S2, RRR  Gastrointestinal: BS+, soft, NT/ND, neg HSM,  Extremities: No peripheral edema, neg clubbing, cyanosis  Vascular: 2+ peripheral pulses  Neurological: alert and responsive. no focal asymmetry  Psychiatric: Normal mood, normal affect  Skin: No rashes    LABS:                        11.5   6.79  )-----------( 261      ( 28 Aug 2019 09:15 )             35.9     08-28    133<L>  |  97  |  20  ----------------------------<  96  4.4   |  26  |  0.93    Ca    9.1      28 Aug 2019 06:50          RADIOLOGY & ADDITIONAL TESTS:

## 2019-08-29 NOTE — DISCHARGE NOTE PROVIDER - CARE PROVIDER_API CALL
Natali Casillas)  Infectious Disease; Internal Medicine  [address] [place] [address]  Phone: [phone]  Fax: [phone]  Follow Up Time:     Franko Moody)  Internal Medicine  [address] [facility], [address]  [place] [address]  Phone: [phone]  Fax: [phone]  Follow Up Time:     Berto Maradiaga)  Cardiovascular Disease; Internal Medicine  [address] [place] [address]  Phone: [phone]  Fax: [phone]  Follow Up Time:     Mayco Tovar (DO)  Neurology; Vascular Neurology  [address] [facility] [address] [facility] [address]  [place] [address]  Phone: [phone]  Fax: [phone]  Follow Up Time:

## 2019-08-29 NOTE — PROGRESS NOTE ADULT - ASSESSMENT
84 year old with PMHx of hypertension and mycobacterium avium-intracellulare who presented with slurred speech, altered mental status, dizziness and generalized weakness for 1 day found to have hypertensive urgency and hyponatremia.    Weight loss and esophageal dilation +/- reported regurgitation (currently denies)  Dilated esophagus on CT Chest 6/2019 - reviewed imaging; esophagus appears dilated down to level where it gets compressed by heart/atrium.  Clinically does not appear to have oropharyngeal symptoms at this time  LLL consolidation reviewed and discussed with ID ?aspiration    Clinically appears stable, no leukocytosis or acute respiratory symptoms  Esophagram +presbyesophagus, no stricture or extrinsic compression. no esophageal dilation    RECS:  -PO diet and supplements as tolerated  -Renal follow up, monitor Na+ (improving)  -off ABx as per ID  -PO PPI daily  -No plans for further GI workup at this time  -Await SLP evaluation (if pt agrees, currently reluctant as she would like to go home; could potentially be done as outpt) no GI objection to administration of all necessary textures/consistencies for SLP evaluation    Discussed with pt, all questions answered  Discussed with Medicine NP    Shahbaz Miranda PA-C    [facility]  [phone]  After hours and weekend coverage [phone]

## 2019-08-29 NOTE — PROGRESS NOTE ADULT - PROBLEM SELECTOR PLAN 1
Confusion weakness and speech resolved.  Neuro input noted
On presentation, Na 125 now with improvement to 133. On exam, patient appears euvolemic without AMS. Initial urine studies with osmolality >300, urine Na >20. Repeat urine studies with osm >500, urine Na >20, indicating likely SIADH component to hyponatremia. Differential: euvolemic hyponatremia (SIADH, thiazide use) vs hypovolemic hyponatremia. Patient likely hyponatremic d/t multifactorial causes:  dyazide (triamterene-hydrochlorothiazide) use, SIADH 2/2 new pulmonary process (PEACE vs malignancy vs infection/aspiration pneumonia). There is likely an additional component of dehydration/poor PO intake underlying hyponatremia. Patient's nausea with PO intake also driving increased ADH.   - Trend BMP daily  - Limit free water intake  - Give 1g salt tabs BID  - Recommend increase protein intake, may benefit from nutrition consult.

## 2019-08-29 NOTE — PROGRESS NOTE ADULT - PROBLEM SELECTOR PLAN 3
Resolved; ? if this could have contributing to some of the patient's presenting sx?  Diuretic was on hold

## 2019-08-29 NOTE — PROGRESS NOTE ADULT - PROVIDER SPECIALTY LIST ADULT
Cardiology
Cardiology
Pulmonology
Pulmonology
Internal Medicine
Gastroenterology
Gastroenterology
Internal Medicine
Internal Medicine
Infectious Disease
Nephrology

## 2019-08-29 NOTE — PROGRESS NOTE ADULT - SUBJECTIVE AND OBJECTIVE BOX
PULM/MED PROGRESS NOTE:    awake, alert comfortable sitting in chair  Appetite reduced - able to take nutritional supplements  Some productive cough - pt off abx currently      MEDICATIONS  (STANDING):  amLODIPine   Tablet 5 milliGRAM(s) Oral daily  aspirin enteric coated 81 milliGRAM(s) Oral daily  carvedilol 6.25 milliGRAM(s) Oral every 12 hours  heparin  Injectable 5000 Unit(s) SubCutaneous every 12 hours  lisinopril 20 milliGRAM(s) Oral daily  pantoprazole    Tablet 40 milliGRAM(s) Oral before breakfast  polyethylene glycol 3350 17 Gram(s) Oral daily  sodium chloride 1 Gram(s) Oral two times a day      MEDICATIONS  (PRN):  acetaminophen   Tablet .. 650 milliGRAM(s) Oral every 6 hours PRN Temp greater or equal to 38.5C (101.3F), Mild Pain (1 - 3)          Vital Signs Last 24 Hrs  T(C): 36.6 (29 Aug 2019 11:31), Max: 36.8 (28 Aug 2019 20:54)  T(F): 97.9 (29 Aug 2019 11:31), Max: 98.2 (28 Aug 2019 20:54)  HR: 76 (29 Aug 2019 11:31) (62 - 86)  BP: 134/89 (29 Aug 2019 11:31) (115/82 - 146/64)  BP(mean): --  RR: 18 (29 Aug 2019 11:31) (18 - 18)  SpO2: 98% (29 Aug 2019 11:31) (95% - 100%)    PHYSICAL EXAMINATION:    GENERAL: The patient is awake and alert in no apparent distress.     NECK: Supple.    LUNGS: Bilateral breath sounds; some rhonchi present; respirations unlabored    HEART: Regular rate and rhythm without murmur.    ABDOMEN: Soft, nontender     EXTREMITIES: Without edema.    NEUROLOGIC: Grossly intact.      LABS:                        11.5   6.79  )-----------( 261      ( 28 Aug 2019 09:15 )             35.9     08-28    133<L>  |  97  |  20  ----------------------------<  96  4.4   |  26  |  0.93    Ca    9.1      28 Aug 2019 06:50            MICROBIOLOGY:  Culture Results:   Normal Respiratory Iram present (08-27 @ 16:44)      RADIOLOGY & ADDITIONAL STUDIES:      EXAM:  CT CHEST                            PROCEDURE DATE:  08/27/2019            INTERPRETATION:  CLINICAL INFORMATION: Cough, fever, PEACE    COMPARISON: CT chest 6/7/2019-7/11/2018    PROCEDURE:   CT of the Chest was performed without intravenous contrast.  Sagittal and coronal reformats were performed.      FINDINGS:    LUNGS AND AIRWAYS: Patent central airways.  In comparison with 6/7/2019,   interval decrease of mucoid impaction within the segmental branches of   basilar left lower lobe bronchus and slight improvement of aeration   within this area. The consolidation within left lower lobe is now similar   to that of 7/11/2018. Otherwise bilateral areas of bronchiectasis/mucoid   impactions are unchanged. No new consolidation.    PLEURA: No pleural effusion.    MEDIASTINUM AND HAILEY: The esophagus is dilated. No lymphadenopathy.    VESSELS: Mild calcific coronary atherosclerosis. Mild aortic valve   leaflet calcification. Thoracic aorta and pulmonary artery normal in   diameter.    HEART: Heart size is normal. Trace pericardial effusion.    CHEST WALL AND LOWER NECK: Within normal limits.    VISUALIZED UPPER ABDOMEN: Nodular thickening of the left adrenal gland is   again noted.    BONES: Within normal limits.    IMPRESSION:     In comparison with 6/7/2019, interval decrease of mucoid impaction within   the segmental branches of basilar left lower lobe bronchus and slight   improvement of aeration within this area. The consolidation within left   lower lobe is now similar to that of 7/11/2018. Otherwise, bilateral   areas of bronchiectasis/mucoid impactions are unchanged. No new   consolidation.        CARLOTTA CARBALLO M.D., ATTENDING RADIOLOGIST  This document has been electronically signed. Aug 28 2019 10:36AM

## 2019-08-30 PROBLEM — I10 ESSENTIAL (PRIMARY) HYPERTENSION: Chronic | Status: ACTIVE | Noted: 2019-08-26

## 2019-08-30 PROBLEM — A31.0 PULMONARY MYCOBACTERIAL INFECTION: Chronic | Status: ACTIVE | Noted: 2019-08-26

## 2019-09-03 ENCOUNTER — APPOINTMENT (OUTPATIENT)
Dept: INFECTIOUS DISEASE | Facility: CLINIC | Age: 84
End: 2019-09-03
Payer: MEDICARE

## 2019-09-03 VITALS
SYSTOLIC BLOOD PRESSURE: 137 MMHG | TEMPERATURE: 97.1 F | BODY MASS INDEX: 16.9 KG/M2 | DIASTOLIC BLOOD PRESSURE: 74 MMHG | WEIGHT: 99 LBS | HEIGHT: 64 IN | OXYGEN SATURATION: 99 % | HEART RATE: 76 BPM | RESPIRATION RATE: 16 BRPM

## 2019-09-03 DIAGNOSIS — R53.83 OTHER FATIGUE: ICD-10-CM

## 2019-09-03 DIAGNOSIS — A31.0 PULMONARY MYCOBACTERIAL INFECTION: ICD-10-CM

## 2019-09-03 LAB — ANA TITR SER: NEGATIVE — SIGNIFICANT CHANGE UP

## 2019-09-03 PROCEDURE — 99215 OFFICE O/P EST HI 40 MIN: CPT

## 2019-09-03 NOTE — CHART NOTE - NSCHARTNOTEFT_GEN_A_CORE
Pt with severe protein calorie malnutrition with 15+ pound weight loss, +temporal wasting on exam    Rx with PO nutritional supplements    Shahbaz Miranda PALYLY    [facility]  [phone]  After hours and weekend coverage [phone]

## 2019-09-03 NOTE — PHYSICAL EXAM
[General Appearance - In No Acute Distress] : in no acute distress [General Appearance - Alert] : alert [Sclera] : the sclera and conjunctiva were normal [PERRL With Normal Accommodation] : pupils were equal in size, round, reactive to light [Extraocular Movements] : extraocular movements were intact [Outer Ear] : the ears and nose were normal in appearance [Oropharynx] : the oropharynx was normal with no thrush [Neck Appearance] : the appearance of the neck was normal [Neck Cervical Mass (___cm)] : no neck mass was observed [Jugular Venous Distention Increased] : there was no jugular-venous distention [Thyroid Diffuse Enlargement] : the thyroid was not enlarged [Auscultation Breath Sounds / Voice Sounds] : lungs were clear to auscultation bilaterally [Heart Rate And Rhythm] : heart rate was normal and rhythm regular [Heart Sounds] : normal S1 and S2 [Heart Sounds Gallop] : no gallops [Murmurs] : no murmurs [Heart Sounds Pericardial Friction Rub] : no pericardial rub [Full Pulse] : the pedal pulses are present [Edema] : there was no peripheral edema [Abdomen Soft] : soft [Bowel Sounds] : normal bowel sounds [Abdomen Tenderness] : non-tender [Abdomen Mass (___ Cm)] : no abdominal mass palpated [Costovertebral Angle Tenderness] : no CVA tenderness [No Palpable Adenopathy] : no palpable adenopathy [Musculoskeletal - Swelling] : no joint swelling [Nail Clubbing] : no clubbing  or cyanosis of the fingernails [Motor Tone] : muscle strength and tone were normal [Skin Color & Pigmentation] : normal skin color and pigmentation [] : no rash [Deep Tendon Reflexes (DTR)] : deep tendon reflexes were 2+ and symmetric [Sensation] : the sensory exam was normal to light touch and pinprick [No Focal Deficits] : no focal deficits [Oriented To Time, Place, And Person] : oriented to person, place, and time [Affect] : the affect was normal

## 2019-09-04 LAB
ALBUMIN SERPL ELPH-MCNC: 4.1 G/DL
ALP BLD-CCNC: 58 U/L
ALT SERPL-CCNC: 17 U/L
ANION GAP SERPL CALC-SCNC: 11 MMOL/L
AST SERPL-CCNC: 17 U/L
BASOPHILS # BLD AUTO: 0.08 K/UL
BASOPHILS NFR BLD AUTO: 0.8 %
BILIRUB SERPL-MCNC: 0.2 MG/DL
BUN SERPL-MCNC: 21 MG/DL
CALCIUM SERPL-MCNC: 10.3 MG/DL
CHLORIDE SERPL-SCNC: 102 MMOL/L
CO2 SERPL-SCNC: 26 MMOL/L
CREAT SERPL-MCNC: 0.87 MG/DL
DEPRECATED KAPPA LC FREE/LAMBDA SER: 1.34 RATIO
EOSINOPHIL # BLD AUTO: 0.21 K/UL
EOSINOPHIL NFR BLD AUTO: 2.2 %
GLUCOSE SERPL-MCNC: 78 MG/DL
HCT VFR BLD CALC: 41 %
HGB BLD-MCNC: 12.5 G/DL
IGA SER QL IEP: 331 MG/DL
IGG SER QL IEP: 1358 MG/DL
IGG SUBSET TOTAL IGG: 1471 MG/DL
IGG1 SER-MCNC: 597 MG/DL
IGG2 SER-MCNC: 641 MG/DL
IGG3 SER-MCNC: 27 MG/DL
IGG4 SER-MCNC: 91 MG/DL
IGM SER QL IEP: 119 MG/DL
IMM GRANULOCYTES NFR BLD AUTO: 0.8 %
KAPPA LC CSF-MCNC: 2.49 MG/DL
KAPPA LC SERPL-MCNC: 3.34 MG/DL
LYMPHOCYTES # BLD AUTO: 1.3 K/UL
LYMPHOCYTES NFR BLD AUTO: 13.5 %
MAN DIFF?: NORMAL
MCHC RBC-ENTMCNC: 30.5 GM/DL
MCHC RBC-ENTMCNC: 30.6 PG
MCV RBC AUTO: 100.5 FL
MONOCYTES # BLD AUTO: 1.43 K/UL
MONOCYTES NFR BLD AUTO: 14.9 %
NEUTROPHILS # BLD AUTO: 6.52 K/UL
NEUTROPHILS NFR BLD AUTO: 67.8 %
PLATELET # BLD AUTO: 318 K/UL
POTASSIUM SERPL-SCNC: 5.8 MMOL/L
PROT SERPL-MCNC: 7.3 G/DL
RBC # BLD: 4.08 M/UL
RBC # FLD: 14.3 %
SODIUM SERPL-SCNC: 139 MMOL/L
WBC # FLD AUTO: 9.62 K/UL

## 2019-09-06 LAB — BACTERIA SPT CULT: NORMAL

## 2019-09-07 NOTE — HISTORY OF PRESENT ILLNESS
[FreeTextEntry1] : 83 yo female comes for follow up s/p recent hospitalization for AMS. Pt with h/o htn and bronchiectasis. SHe had been on treatment for PEACE since June. Pt was found to  have hypertension and  hyponatremia.  Code stroke was called   neuro consulted and ruled out for acute CVA. \par Sodium improved with salt tabs. BP meds adjusted. PEACE treatment on hold. \par  CT of chest revealed bronchiectasis with mucoid impaction and slightly improved LLL infiltrate. Esophagus was noted to be dilated. Pt had a normal esophagram but left before her swallow study. PEACE meds were held b/o concern for there interaction with the BP meds\par Pt feels much improved now off the PEACE meds\par pt's  now  reports  that patient had pneumonia three times in 2017\par Leaving for [place] in ten days\par \par coughing up phlegm\par sometimes yellow and sometimes green\par no fevers or chills\par still with pleuritic chest pain on left\par \par fatigue with exertion\par \par felt tired all the time on the abs\par \par eating better now\par eating ensure- lost about 14 lbs\par

## 2019-09-07 NOTE — REVIEW OF SYSTEMS
[Recent Weight Gain (___ Lbs)] : recent [unfilled] ~Ulb weight gain [Sputum] : coughing up ~M sputum [Pleuritic Chest Pain] : pleuritic chest pain [Negative] : Heme/Lymph [Fever] : no fever [FreeTextEntry3] : went for eye exam and everything ok [FreeTextEntry4] : hearing feels off  granddaughter will check in [place] [FreeTextEntry7] : sometimes if takes too much to swallow and then can't swallow all of it  majority of time ok     eating doesn't make patient cough [FreeTextEntry5] : left sided chest pain- at times

## 2019-09-07 NOTE — ASSESSMENT
[FreeTextEntry1] : \par 85yo F hx of PEACE, HTN presents with slurred speech / weakness starting at 9am when she woke up. Last known normal was 12pm last night. Code stroke was called. Generalized weakness, patient was confused as well. Recent started Rx for PEACE with Rifampin, Azithromycin and Ethambutol .  found to have elevated BP and hyponatremia. neuro consulted and ruled out for acute CVA. \par Sodium improved with salt tabs. BP meds adjusted. PEACE treatment on hold. Dilated esophagus on CT. esophagogram no obstruction. outpt f/u with pulm, ID. \par \par Patient  with chronic PEACE , the  acute  question  is what is causing the LLL changes and pleuritic chest pain- the consolidation is not classic for MA nor the pleuritic component.\par Is this all PEACE or does this represent aspiration pneumonia or is this some other pathogen or anything else?\par \par Pt had deferred swallow study, the esophagram was non diagnostic \par Pt for possible bronchoscopy\par \par also unclear why patient has PEACE/ bronchiectasis\par will check immunoglobulin studies, alpha antitrypsin and cystic fibrosis genes\par \par \par Will repeat labs and sputum studies

## 2019-09-10 LAB — CFTR MUT TESTED BLD/T: NEGATIVE

## 2019-09-13 LAB — SERPINA1 GENE MUT TESTED BLD/T: NORMAL

## 2019-09-19 PROCEDURE — 70551 MRI BRAIN STEM W/O DYE: CPT

## 2019-09-19 PROCEDURE — 99285 EMERGENCY DEPT VISIT HI MDM: CPT

## 2019-09-19 PROCEDURE — 82962 GLUCOSE BLOOD TEST: CPT

## 2019-09-19 PROCEDURE — 85027 COMPLETE CBC AUTOMATED: CPT

## 2019-09-19 PROCEDURE — 70450 CT HEAD/BRAIN W/O DYE: CPT

## 2019-09-19 PROCEDURE — 80048 BASIC METABOLIC PNL TOTAL CA: CPT

## 2019-09-19 PROCEDURE — 71250 CT THORAX DX C-: CPT

## 2019-09-19 PROCEDURE — 70496 CT ANGIOGRAPHY HEAD: CPT

## 2019-09-19 PROCEDURE — 80053 COMPREHEN METABOLIC PANEL: CPT

## 2019-09-19 PROCEDURE — 84300 ASSAY OF URINE SODIUM: CPT

## 2019-09-19 PROCEDURE — 82607 VITAMIN B-12: CPT

## 2019-09-19 PROCEDURE — 85730 THROMBOPLASTIN TIME PARTIAL: CPT

## 2019-09-19 PROCEDURE — 83935 ASSAY OF URINE OSMOLALITY: CPT

## 2019-09-19 PROCEDURE — 86038 ANTINUCLEAR ANTIBODIES: CPT

## 2019-09-19 PROCEDURE — 85610 PROTHROMBIN TIME: CPT

## 2019-09-19 PROCEDURE — 82533 TOTAL CORTISOL: CPT

## 2019-09-19 PROCEDURE — 82570 ASSAY OF URINE CREATININE: CPT

## 2019-09-19 PROCEDURE — 84443 ASSAY THYROID STIM HORMONE: CPT

## 2019-09-19 PROCEDURE — 70498 CT ANGIOGRAPHY NECK: CPT

## 2019-09-19 PROCEDURE — 82746 ASSAY OF FOLIC ACID SERUM: CPT

## 2019-09-19 PROCEDURE — 81001 URINALYSIS AUTO W/SCOPE: CPT

## 2019-09-19 PROCEDURE — 93005 ELECTROCARDIOGRAM TRACING: CPT

## 2019-09-19 PROCEDURE — 86235 NUCLEAR ANTIGEN ANTIBODY: CPT

## 2019-09-19 PROCEDURE — 74220 X-RAY XM ESOPHAGUS 1CNTRST: CPT

## 2019-09-19 PROCEDURE — 87070 CULTURE OTHR SPECIMN AEROBIC: CPT

## 2019-09-19 PROCEDURE — 71045 X-RAY EXAM CHEST 1 VIEW: CPT

## 2019-10-03 LAB — FUNGUS SPT CULT: NORMAL

## 2019-10-23 LAB — ACID FAST STN SPT: NORMAL

## 2020-01-13 NOTE — ED PROVIDER NOTE - EKG #1 DATE/TIME
26-Aug-2019 15:45 Mucosal Advancement Flap Text: Given the location of the defect, shape of the defect and the proximity to free margins a mucosal advancement flap was deemed most appropriate. Incisions were made with a 15 blade scalpel in the appropriate fashion along the cutaneous vermillion border and the mucosal lip. The remaining actinically damaged mucosal tissue was excised.  The mucosal advancement flap was then elevated to the gingival sulcus with care taken to preserve the neurovascular structures and advanced into the primary defect. Care was taken to ensure that precise realignment of the vermillion border was achieved.
